# Patient Record
Sex: MALE | Race: WHITE | NOT HISPANIC OR LATINO
[De-identification: names, ages, dates, MRNs, and addresses within clinical notes are randomized per-mention and may not be internally consistent; named-entity substitution may affect disease eponyms.]

---

## 2017-01-31 ENCOUNTER — RESULT CHARGE (OUTPATIENT)
Age: 22
End: 2017-01-31

## 2017-01-31 ENCOUNTER — APPOINTMENT (OUTPATIENT)
Dept: PEDIATRIC ADOLESCENT MEDICINE | Facility: CLINIC | Age: 22
End: 2017-01-31

## 2017-01-31 DIAGNOSIS — Z00.00 ENCOUNTER FOR GENERAL ADULT MEDICAL EXAMINATION W/OUT ABNORMAL FINDINGS: ICD-10-CM

## 2017-02-10 LAB — GLUCOSE BLDC GLUCOMTR-MCNC: NORMAL

## 2017-03-17 ENCOUNTER — RESULT CHARGE (OUTPATIENT)
Age: 22
End: 2017-03-17

## 2017-03-17 ENCOUNTER — APPOINTMENT (OUTPATIENT)
Dept: PEDIATRIC ADOLESCENT MEDICINE | Facility: CLINIC | Age: 22
End: 2017-03-17

## 2017-03-20 LAB — GLUCOSE BLDC GLUCOMTR-MCNC: NORMAL

## 2017-03-28 ENCOUNTER — APPOINTMENT (OUTPATIENT)
Dept: PEDIATRIC ADOLESCENT MEDICINE | Facility: CLINIC | Age: 22
End: 2017-03-28

## 2017-03-29 ENCOUNTER — APPOINTMENT (OUTPATIENT)
Dept: PEDIATRIC ADOLESCENT MEDICINE | Facility: CLINIC | Age: 22
End: 2017-03-29

## 2017-03-29 DIAGNOSIS — Z71.3 DIETARY COUNSELING AND SURVEILLANCE: ICD-10-CM

## 2017-03-30 LAB — GLUCOSE BLDC GLUCOMTR-MCNC: NORMAL

## 2017-04-27 ENCOUNTER — APPOINTMENT (OUTPATIENT)
Dept: PEDIATRIC ADOLESCENT MEDICINE | Facility: CLINIC | Age: 22
End: 2017-04-27

## 2017-04-27 VITALS
RESPIRATION RATE: 16 BRPM | TEMPERATURE: 97.9 F | SYSTOLIC BLOOD PRESSURE: 110 MMHG | HEART RATE: 64 BPM | DIASTOLIC BLOOD PRESSURE: 68 MMHG

## 2017-04-27 DIAGNOSIS — L03.012 CELLULITIS OF LEFT FINGER: ICD-10-CM

## 2017-05-10 ENCOUNTER — APPOINTMENT (OUTPATIENT)
Dept: PEDIATRIC ADOLESCENT MEDICINE | Facility: CLINIC | Age: 22
End: 2017-05-10

## 2017-05-10 VITALS
DIASTOLIC BLOOD PRESSURE: 70 MMHG | HEART RATE: 60 BPM | RESPIRATION RATE: 16 BRPM | SYSTOLIC BLOOD PRESSURE: 120 MMHG | TEMPERATURE: 97.7 F

## 2017-05-10 DIAGNOSIS — M25.552 PAIN IN LEFT HIP: ICD-10-CM

## 2017-05-10 RX ORDER — IBUPROFEN 200 MG/1
200 TABLET, FILM COATED ORAL
Refills: 0 | Status: COMPLETED | OUTPATIENT
Start: 2017-05-10

## 2017-05-10 RX ADMIN — IBUPROFEN 2 MG: 200 TABLET, FILM COATED ORAL at 00:00

## 2017-05-23 ENCOUNTER — APPOINTMENT (OUTPATIENT)
Dept: PEDIATRIC ADOLESCENT MEDICINE | Facility: CLINIC | Age: 22
End: 2017-05-23

## 2017-05-23 VITALS
HEART RATE: 70 BPM | SYSTOLIC BLOOD PRESSURE: 115 MMHG | RESPIRATION RATE: 16 BRPM | DIASTOLIC BLOOD PRESSURE: 60 MMHG | TEMPERATURE: 98.2 F

## 2017-05-23 DIAGNOSIS — S69.92XA UNSPECIFIED INJURY OF LEFT WRIST, HAND AND FINGER(S), INITIAL ENCOUNTER: ICD-10-CM

## 2017-05-23 RX ORDER — ACETAMINOPHEN 325 MG/1
325 TABLET ORAL
Refills: 0 | Status: COMPLETED | OUTPATIENT
Start: 2017-05-23

## 2017-05-23 RX ADMIN — ACETAMINOPHEN 2 MG: 325 TABLET ORAL at 00:00

## 2017-06-09 ENCOUNTER — APPOINTMENT (OUTPATIENT)
Dept: PEDIATRIC ADOLESCENT MEDICINE | Facility: CLINIC | Age: 22
End: 2017-06-09

## 2017-06-09 ENCOUNTER — OUTPATIENT (OUTPATIENT)
Dept: OUTPATIENT SERVICES | Facility: HOSPITAL | Age: 22
LOS: 1 days | Discharge: HOME | End: 2017-06-09

## 2017-06-09 VITALS
RESPIRATION RATE: 16 BRPM | HEART RATE: 72 BPM | SYSTOLIC BLOOD PRESSURE: 120 MMHG | DIASTOLIC BLOOD PRESSURE: 70 MMHG | TEMPERATURE: 98.5 F

## 2017-06-09 DIAGNOSIS — Z00.00 ENCOUNTER FOR GENERAL ADULT MEDICAL EXAMINATION W/OUT ABNORMAL FINDINGS: ICD-10-CM

## 2017-06-09 DIAGNOSIS — Z71.89 OTHER SPECIFIED COUNSELING: ICD-10-CM

## 2017-06-09 DIAGNOSIS — Z87.09 PERSONAL HISTORY OF OTHER DISEASES OF THE RESPIRATORY SYSTEM: ICD-10-CM

## 2017-06-12 LAB — BACTERIA THROAT CULT: NORMAL

## 2017-06-28 DIAGNOSIS — Z71.89 OTHER SPECIFIED COUNSELING: ICD-10-CM

## 2017-06-28 DIAGNOSIS — J02.9 ACUTE PHARYNGITIS, UNSPECIFIED: ICD-10-CM

## 2018-09-28 ENCOUNTER — INPATIENT (INPATIENT)
Facility: HOSPITAL | Age: 23
LOS: 4 days | Discharge: HOME | End: 2018-10-03
Attending: INTERNAL MEDICINE | Admitting: INTERNAL MEDICINE

## 2018-09-28 VITALS
DIASTOLIC BLOOD PRESSURE: 74 MMHG | HEIGHT: 68 IN | RESPIRATION RATE: 24 BRPM | SYSTOLIC BLOOD PRESSURE: 128 MMHG | HEART RATE: 118 BPM | OXYGEN SATURATION: 99 % | WEIGHT: 151.02 LBS | TEMPERATURE: 96 F

## 2018-09-28 LAB
ALBUMIN SERPL ELPH-MCNC: 5.4 G/DL — HIGH (ref 3.5–5.2)
ALP SERPL-CCNC: 232 U/L — HIGH (ref 30–115)
ALT FLD-CCNC: 20 U/L — SIGNIFICANT CHANGE UP (ref 0–41)
ANION GAP SERPL CALC-SCNC: 40 MMOL/L — HIGH (ref 7–14)
APPEARANCE UR: CLEAR — SIGNIFICANT CHANGE UP
AST SERPL-CCNC: 25 U/L — SIGNIFICANT CHANGE UP (ref 0–41)
BASE EXCESS BLDV CALC-SCNC: -17.9 MMOL/L — LOW (ref -2–2)
BASOPHILS # BLD AUTO: 0.04 K/UL — SIGNIFICANT CHANGE UP (ref 0–0.2)
BASOPHILS NFR BLD AUTO: 0.2 % — SIGNIFICANT CHANGE UP (ref 0–1)
BILIRUB SERPL-MCNC: 0.4 MG/DL — SIGNIFICANT CHANGE UP (ref 0.2–1.2)
BILIRUB UR-MCNC: NEGATIVE — SIGNIFICANT CHANGE UP
BUN SERPL-MCNC: 21 MG/DL — HIGH (ref 10–20)
CA-I SERPL-SCNC: 1.4 MMOL/L — HIGH (ref 1.12–1.3)
CALCIUM SERPL-MCNC: 10.9 MG/DL — HIGH (ref 8.5–10.1)
CHLORIDE SERPL-SCNC: 87 MMOL/L — LOW (ref 98–110)
CO2 SERPL-SCNC: 9 MMOL/L — CRITICAL LOW (ref 17–32)
COLOR SPEC: YELLOW — SIGNIFICANT CHANGE UP
CREAT SERPL-MCNC: 1.2 MG/DL — SIGNIFICANT CHANGE UP (ref 0.7–1.5)
DIFF PNL FLD: NEGATIVE — SIGNIFICANT CHANGE UP
EOSINOPHIL # BLD AUTO: 0.01 K/UL — SIGNIFICANT CHANGE UP (ref 0–0.7)
EOSINOPHIL NFR BLD AUTO: 0.1 % — SIGNIFICANT CHANGE UP (ref 0–8)
ETHANOL SERPL-MCNC: <10 MG/DL — HIGH
GAS PNL BLDV: 139 MMOL/L — SIGNIFICANT CHANGE UP (ref 136–145)
GAS PNL BLDV: SIGNIFICANT CHANGE UP
GAS PNL BLDV: SIGNIFICANT CHANGE UP
GLUCOSE SERPL-MCNC: 479 MG/DL — CRITICAL HIGH (ref 70–99)
GLUCOSE UR QL: >=1000
HCO3 BLDV-SCNC: 12 MMOL/L — LOW (ref 22–29)
HCT VFR BLD CALC: 48.4 % — SIGNIFICANT CHANGE UP (ref 42–52)
HCT VFR BLDA CALC: 53.3 % — HIGH (ref 34–44)
HGB BLD CALC-MCNC: 17.4 G/DL — SIGNIFICANT CHANGE UP (ref 14–18)
HGB BLD-MCNC: 16.5 G/DL — SIGNIFICANT CHANGE UP (ref 14–18)
IMM GRANULOCYTES NFR BLD AUTO: 0.6 % — HIGH (ref 0.1–0.3)
KETONES UR-MCNC: >=80
LACTATE BLDV-MCNC: 2.5 MMOL/L — HIGH (ref 0.5–1.6)
LACTATE SERPL-SCNC: 2.5 MMOL/L — HIGH (ref 0.5–2.2)
LEUKOCYTE ESTERASE UR-ACNC: NEGATIVE — SIGNIFICANT CHANGE UP
LIDOCAIN IGE QN: 9 U/L — SIGNIFICANT CHANGE UP (ref 7–60)
LYMPHOCYTES # BLD AUTO: 1.77 K/UL — SIGNIFICANT CHANGE UP (ref 1.2–3.4)
LYMPHOCYTES # BLD AUTO: 9.7 % — LOW (ref 20.5–51.1)
MCHC RBC-ENTMCNC: 31.7 PG — HIGH (ref 27–31)
MCHC RBC-ENTMCNC: 34.1 G/DL — SIGNIFICANT CHANGE UP (ref 32–37)
MCV RBC AUTO: 92.9 FL — SIGNIFICANT CHANGE UP (ref 80–94)
MONOCYTES # BLD AUTO: 0.59 K/UL — SIGNIFICANT CHANGE UP (ref 0.1–0.6)
MONOCYTES NFR BLD AUTO: 3.2 % — SIGNIFICANT CHANGE UP (ref 1.7–9.3)
NEUTROPHILS # BLD AUTO: 15.8 K/UL — HIGH (ref 1.4–6.5)
NEUTROPHILS NFR BLD AUTO: 86.2 % — HIGH (ref 42.2–75.2)
NITRITE UR-MCNC: NEGATIVE — SIGNIFICANT CHANGE UP
NRBC # BLD: 0 /100 WBCS — SIGNIFICANT CHANGE UP (ref 0–0)
PCO2 BLDV: 41 MMHG — SIGNIFICANT CHANGE UP (ref 41–51)
PH BLDV: 7.07 — LOW (ref 7.26–7.43)
PH UR: 6 — SIGNIFICANT CHANGE UP (ref 5–8)
PLATELET # BLD AUTO: 325 K/UL — SIGNIFICANT CHANGE UP (ref 130–400)
PO2 BLDV: 35 MMHG — SIGNIFICANT CHANGE UP (ref 20–40)
POTASSIUM BLDV-SCNC: 5 MMOL/L — SIGNIFICANT CHANGE UP (ref 3.3–5.6)
POTASSIUM SERPL-MCNC: 5.4 MMOL/L — HIGH (ref 3.5–5)
POTASSIUM SERPL-SCNC: 5.4 MMOL/L — HIGH (ref 3.5–5)
PROT SERPL-MCNC: 9.2 G/DL — HIGH (ref 6–8)
PROT UR-MCNC: 30
RBC # BLD: 5.21 M/UL — SIGNIFICANT CHANGE UP (ref 4.7–6.1)
RBC # FLD: 12.3 % — SIGNIFICANT CHANGE UP (ref 11.5–14.5)
SAO2 % BLDV: 53 % — SIGNIFICANT CHANGE UP
SODIUM SERPL-SCNC: 136 MMOL/L — SIGNIFICANT CHANGE UP (ref 135–146)
SP GR SPEC: >=1.03 — SIGNIFICANT CHANGE UP (ref 1.01–1.03)
UROBILINOGEN FLD QL: 0.2 — SIGNIFICANT CHANGE UP (ref 0.2–0.2)
WBC # BLD: 18.32 K/UL — HIGH (ref 4.8–10.8)
WBC # FLD AUTO: 18.32 K/UL — HIGH (ref 4.8–10.8)

## 2018-09-28 RX ORDER — INSULIN HUMAN 100 [IU]/ML
7 INJECTION, SOLUTION SUBCUTANEOUS
Qty: 100 | Refills: 0 | Status: DISCONTINUED | OUTPATIENT
Start: 2018-09-28 | End: 2018-09-29

## 2018-09-28 RX ORDER — ONDANSETRON 8 MG/1
4 TABLET, FILM COATED ORAL ONCE
Qty: 0 | Refills: 0 | Status: COMPLETED | OUTPATIENT
Start: 2018-09-28 | End: 2018-09-28

## 2018-09-28 RX ORDER — SODIUM CHLORIDE 9 MG/ML
1000 INJECTION INTRAMUSCULAR; INTRAVENOUS; SUBCUTANEOUS ONCE
Qty: 0 | Refills: 0 | Status: COMPLETED | OUTPATIENT
Start: 2018-09-28 | End: 2018-09-28

## 2018-09-28 RX ORDER — INSULIN HUMAN 100 [IU]/ML
7 INJECTION, SOLUTION SUBCUTANEOUS ONCE
Qty: 0 | Refills: 0 | Status: COMPLETED | OUTPATIENT
Start: 2018-09-28 | End: 2018-09-28

## 2018-09-28 RX ORDER — SODIUM CHLORIDE 9 MG/ML
1000 INJECTION, SOLUTION INTRAVENOUS ONCE
Qty: 0 | Refills: 0 | Status: COMPLETED | OUTPATIENT
Start: 2018-09-28 | End: 2018-09-28

## 2018-09-28 RX ADMIN — ONDANSETRON 4 MILLIGRAM(S): 8 TABLET, FILM COATED ORAL at 21:24

## 2018-09-28 RX ADMIN — SODIUM CHLORIDE 2000 MILLILITER(S): 9 INJECTION INTRAMUSCULAR; INTRAVENOUS; SUBCUTANEOUS at 22:00

## 2018-09-28 RX ADMIN — SODIUM CHLORIDE 2000 MILLILITER(S): 9 INJECTION, SOLUTION INTRAVENOUS at 21:24

## 2018-09-28 RX ADMIN — INSULIN HUMAN 7 UNIT(S)/HR: 100 INJECTION, SOLUTION SUBCUTANEOUS at 23:00

## 2018-09-28 RX ADMIN — INSULIN HUMAN 7 UNIT(S): 100 INJECTION, SOLUTION SUBCUTANEOUS at 23:00

## 2018-09-28 NOTE — ED PROVIDER NOTE - PROGRESS NOTE DETAILS
Patient was given IVF and started on insulin drip in the ED. Felt better after IVF. ICU admission for DKA with AG of 40. Vomiting improved after hydration and Zofran.

## 2018-09-28 NOTE — H&P ADULT - HISTORY OF PRESENT ILLNESS
23M with PMH of DM I, ADHD p/w intractable vomiting and diffuse crampy abdominal pain that started morning of admission. Night prior to presentation he was celebrating birthday and binge drinking, around 20 drinks. Has vomiting about 10 times today, nonbloody.  Denies any fever, chills, sick contacts, SOB, CP, back pain, dysuria. Patient states he is complaint with his medications however recent A1c was 14. He takes Levemir 34units daily and calculates his Humalog each meal. Reports he tries to not exceed 240g carbs in one day. Has had 3 prior episodes of DKA, last one was 3 years ago. Was diagnosed with DM type I when he was about 15yo. He cannot recall his endocrinologist's name.    In ED: Vitals /74, , T 96.4, 99% on RA. U/A +Ketones, glucose >1000, AG 40. VBG on presentation with pH 7.07 lactic acid 2.5.

## 2018-09-28 NOTE — H&P ADULT - ASSESSMENT
23M with PMH of DM I, ADHD p/w DKA     #DKA   - ICU admission   - patient bolused 4L in ED, will c/w IVF NS at 200cc/hr   - Will replete K if <5.3 at 20-30meq per L of IVF  - C/w insulin drip at 0.1U/kg/hr   - Monitor FS q2H  - Once serum glucose falls to < 200 mg/dL change fluids to 5% dextrose with 0.45% sodium chloride   - BMP q4H until gap closes, monitor K, Na, Mg and AG     Dispo: from home   DVT PPx: encourage ambulation 23M with PMH of DM I, ADHD p/w DKA     #DKA   - ICU admission   - patient bolused 4L in ED, will c/w IVF NS at 200cc/hr   - Will replete K if <5.3 at 20-30meq per L of IVF  - C/w insulin drip at 0.1U/kg/hr   - Monitor FS q2H  - Once serum glucose falls to < 200 mg/dL change fluids to 5% dextrose with 0.45% sodium chloride   - BMP q4H until gap closes, monitor K, Na, Mg and AG     Dispo: from home   Joshua (father) - [phone]

## 2018-09-28 NOTE — ED ADULT NURSE NOTE - OBJECTIVE STATEMENT
patient hx of dm and DKA. patient was out celebrating his birthday and was drinking alcohol last night . patient woke up with n\v and ab pain. patient states he feels like he was going into dka again.

## 2018-09-28 NOTE — ED PROVIDER NOTE - ATTENDING CONTRIBUTION TO CARE
I personally evaluated the patient. I reviewed the Resident’s or Physician Assistant’s note (as assigned above), and agree with the findings and plan except as documented in my note.     23 male here for intractable vomiting. Had been drinking alcohol in celebration of his birthday. Now admits to elevated fingerstick at home, polyuria, thirst, generalized malaise    PMH: DM, ADHD    PE: male in no distress. HEENT: mucosa dry. non icteric. CHEST: normal work of breathing. CV: pulses intact. SKIN: normal no pallor. NEURO: AAO 3 no focal deficits.     Impression: DKA    Plan: IV labs imaging supportive care resuscitation and admission to ICU setting.

## 2018-09-28 NOTE — ED PROVIDER NOTE - MEDICAL DECISION MAKING DETAILS
23 male diabetic here for intractable vomiting and suspicion of diabetic emergency after binge drinking this weekend.  Found to be in DKA got crystalloid resuscitation, insulin drip, labs and admission to ICU setting.

## 2018-09-28 NOTE — H&P ADULT - NSHPPHYSICALEXAM_GEN_ALL_CORE
PHYSICAL EXAM:  GEN: No acute distress  LUNGS: Clear to auscultation bilaterally   HEART: S1/S2 present. RRR.   ABD: Soft, non-tender, non-distended. Bowel sounds present  EXT: NC/NC/NE/2+PP/GUTIERREZ  NEURO: AAOX3

## 2018-09-28 NOTE — ED ADULT TRIAGE NOTE - RESPIRATORY RATE (BREATHS/MIN)
Patient had 48 Hour Holter Monitor placed 04/09/18 by Christine Chávez. Patient tolerated well and verbalized understanding.    
24

## 2018-09-28 NOTE — ED PROVIDER NOTE - PHYSICAL EXAMINATION
PHYSICAL EXAM: I have reviewed current vital signs.  GENERAL: NAD, well-nourished; well-developed.  HEAD:  Normocephalic, atraumatic.  EYES: PERRL, conjunctiva and sclera clear.  ENT: Dry MM, no erythema/exudates.  NECK: Supple, full ROM.  CHEST/LUNG: Clear to auscultation bilaterally; no wheezes, rales, or rhonchi.  HEART: Regular rate and rhythm, normal S1 and S2; no murmurs, rubs, or gallops.  ABDOMEN: Soft, nontender, nondistended; bowel sounds present.  EXTREMITIES:  2+ peripheral pulses.  NEUROLOGY: AAO x 3. Motor 5/5. No focal neurological deficits.  SKIN: No rashes or lesions.

## 2018-09-28 NOTE — ED PROVIDER NOTE - NS ED ROS FT
Constitutional:  No fevers or chills.  Eyes:  No visual changes.  ENT:  No sore throat.  Neck:  No neck pain.  Cardiac:  No CP.  Resp:  No cough or SOB.  GI:  +N/v and crampy abdominal pain. No diarrhea.  :  No dysuria, frequency, or hematuria.  MSK:  No myalgias or joint pain/swelling.  Neuro:  No headache, dizziness, or weakness.  Skin:  No skin rash. Constitutional:  No fevers or chills.  Eyes:  No visual changes.  ENT:  No sore throat.  Neck:  No neck pain.  Cardiac:  No CP.  Resp:  No cough or SOB.  GI:  +N/v and crampy abdominal pain. No diarrhea.  :  +Frequency, no dysuria or hematuria.  MSK:  No myalgias or joint pain/swelling.  Neuro:  No headache, dizziness, or weakness.  Skin:  No skin rash.

## 2018-09-28 NOTE — ED PROVIDER NOTE - OBJECTIVE STATEMENT
24yo M with PMH of DM I p/w intractable vomiting and diffuse crampy abdominal pain that started at about 4AM this morning. States he was celebrating his birthday last night and drank about 6-7 drinks. Has vomiting about 10 times today, nonbloody. FS of 593 in triage. Denies any recent sickness, f/c, SOB, CP, back pain, extremity weakness, or urinary complaints. Reports his insulin to carb ratio is 1:10,  is his target, sensitivity factor is 49, BS typically runs about 150s at home, and he has been compliant with his insulin regimen. He takes Levemir 34units daily and calculates his Humalog each meal. Reports he tries to not exceed 240g carbs in one day. Has had 3 prior episodes of DKA, last one was 3 years ago. Was diagnosed with DM type I when he was about 13yo. PCP is Dr. Faye, cannot recall his endocrinologist's name. 22yo M with PMH of DM I p/w intractable vomiting and diffuse crampy abdominal pain that started at about 4AM this morning. States he was celebrating his birthday last night and drank about 6-7 drinks. Has vomiting about 10 times today, nonbloody. FS of 593 in triage. Denies any recent sickness, f/c, SOB, CP, back pain, extremity weakness, or urinary complaints. Reports his insulin to carb ratio is 1:10,  is his target, sensitivity factor is 49, BS typically runs about 150s at home, and he has been compliant with his insulin regimen. He takes Levemir 34units daily and calculates his Humalog each meal. Reports he tries to not exceed 240g carbs in one day. Has had 3 prior episodes of DKA, last one was 3 years ago. Was diagnosed with DM type I when he was about 13yo. PCP is Dr. Summers, cannot recall his endocrinologist's name. 22yo M with PMH of DM I p/w intractable vomiting and diffuse crampy abdominal pain that started at about 4AM this morning. States he was celebrating his birthday last night and drank about 6-7 drinks. Has vomiting about 10 times today, nonbloody. FS of 593 in triage. Denies any recent sickness, f/c, SOB, CP, back pain, extremity weakness, or urinary complaints. Reports his insulin to carb ratio is 1:10,  is his target, sensitivity factor is 49, BS typically runs about 150s at home, and he has been compliant with his insulin regimen. He takes Levemir 34units daily and calculates his Humalog each meal. Reports he tries to not exceed 240g carbs in one day. Has had 3 prior episodes of DKA, last one was 3 years ago. Was diagnosed with DM type I when he was about 15yo. PCP is Dr. Summers, cannot recall his endocrinologist's name. His Dad does state he does not have the best diet. 24yo M with PMH of DM I p/w intractable vomiting and diffuse crampy abdominal pain that started at about 4AM this morning. States he was celebrating his birthday last night and drank about 6-7 drinks. Has vomited about 10 times today, nonbloody. FS of 593 in triage. Denies any recent sickness, f/c, SOB, CP, back pain, extremity weakness, or urinary complaints. Reports his insulin to carb ratio is 1:10,  is his target, BS typically runs about 150s at home, and he has been compliant with his insulin regimen. He takes Levemir 34units daily and calculates his Humalog each meal. Reports he tries to not exceed 240g carbs in one day. Has had 3 prior episodes of DKA, last one was 3 years ago. Was diagnosed with DM type I when he was about 15yo. PCP is Dr. Summers, cannot recall his endocrinologist's name. His Dad does state he does not have the best diet. 22yo M with PMH of DM I p/w intractable vomiting and diffuse crampy abdominal pain that started at about 4AM this morning. States he was celebrating his birthday last night and drank about 6-7 drinks. Has vomited about 10 times today, nonbloody. FS of 593 in triage. Denies any recent sickness, f/c, SOB, CP, or back pain. Reports his insulin to carb ratio is 1:10,  is his target, BS typically runs about 150s at home, and he has been compliant with his insulin regimen. He takes Levemir 34units daily and calculates his Humalog each meal. Reports he tries to not exceed 240g carbs in one day. Has had 3 prior episodes of DKA, last one was 3 years ago. Was diagnosed with DM type I when he was about 13yo. PCP is Dr. Summers, cannot recall his endocrinologist's name. His Dad does state he does not have the best diet.

## 2018-09-28 NOTE — H&P ADULT - NSHPLABSRESULTS_GEN_ALL_CORE
Vitals:    Vital Signs Last 24 Hrs  T(C): 36.1 (28 Sep 2018 22:03), Max: 36.1 (28 Sep 2018 22:03)  T(F): 97 (28 Sep 2018 22:03), Max: 97 (28 Sep 2018 22:03)  HR: 118 (28 Sep 2018 20:27) (118 - 118)  BP: 132/81 (28 Sep 2018 22:03) (128/74 - 132/81)  BP(mean): --  RR: 20 (28 Sep 2018 22:03) (20 - 24)  SpO2: 99% (28 Sep 2018 22:03) (99% - 99%)    Labs:     09-28    136  |  87<L>  |  21<H>  ----------------------------<  479<HH>  5.4<H>   |  9<LL>  |  1.2    Ca    10.9<H>      28 Sep 2018 21:05    TPro  9.2<H>  /  Alb  5.4<H>  /  TBili  0.4  /  DBili  x   /  AST  25  /  ALT  20  /  AlkPhos  232<H>  09-28                          16.5   18.32 )-----------( 325      ( 28 Sep 2018 21:05 )             48.4         LIVER FUNCTIONS - ( 28 Sep 2018 21:05 )  Alb: 5.4 g/dL / Pro: 9.2 g/dL / ALK PHOS: 232 U/L / ALT: 20 U/L / AST: 25 U/L / GGT: x             Urinalysis Basic - ( 28 Sep 2018 21:05 )    Color: Yellow / Appearance: Clear / SG: >=1.030 / pH: x  Gluc: x / Ketone: >=80  / Bili: Negative / Urobili: 0.2   Blood: x / Protein: 30 / Nitrite: Negative   Leuk Esterase: Negative / RBC: x / WBC x   Sq Epi: x / Non Sq Epi: x / Bacteria: x

## 2018-09-29 LAB
ALBUMIN SERPL ELPH-MCNC: 4.3 G/DL — SIGNIFICANT CHANGE UP (ref 3.5–5.2)
ALBUMIN SERPL ELPH-MCNC: 4.7 G/DL — SIGNIFICANT CHANGE UP (ref 3.5–5.2)
ALP SERPL-CCNC: 161 U/L — HIGH (ref 30–115)
ALP SERPL-CCNC: 198 U/L — HIGH (ref 30–115)
ALT FLD-CCNC: 13 U/L — SIGNIFICANT CHANGE UP (ref 0–41)
ALT FLD-CCNC: 16 U/L — SIGNIFICANT CHANGE UP (ref 0–41)
ANION GAP SERPL CALC-SCNC: 15 MMOL/L — HIGH (ref 7–14)
ANION GAP SERPL CALC-SCNC: 17 MMOL/L — HIGH (ref 7–14)
ANION GAP SERPL CALC-SCNC: 19 MMOL/L — HIGH (ref 7–14)
ANION GAP SERPL CALC-SCNC: 19 MMOL/L — HIGH (ref 7–14)
ANION GAP SERPL CALC-SCNC: 21 MMOL/L — HIGH (ref 7–14)
ANION GAP SERPL CALC-SCNC: 29 MMOL/L — HIGH (ref 7–14)
APPEARANCE UR: CLEAR — SIGNIFICANT CHANGE UP
AST SERPL-CCNC: 16 U/L — SIGNIFICANT CHANGE UP (ref 0–41)
AST SERPL-CCNC: 19 U/L — SIGNIFICANT CHANGE UP (ref 0–41)
B-OH-BUTYR SERPL-SCNC: 9.9 MMOL/L — HIGH
BASOPHILS # BLD AUTO: 0.01 K/UL — SIGNIFICANT CHANGE UP (ref 0–0.2)
BASOPHILS # BLD AUTO: 0.01 K/UL — SIGNIFICANT CHANGE UP (ref 0–0.2)
BASOPHILS NFR BLD AUTO: 0.1 % — SIGNIFICANT CHANGE UP (ref 0–1)
BASOPHILS NFR BLD AUTO: 0.1 % — SIGNIFICANT CHANGE UP (ref 0–1)
BILIRUB SERPL-MCNC: 0.3 MG/DL — SIGNIFICANT CHANGE UP (ref 0.2–1.2)
BILIRUB SERPL-MCNC: 0.5 MG/DL — SIGNIFICANT CHANGE UP (ref 0.2–1.2)
BILIRUB UR-MCNC: ABNORMAL
BUN SERPL-MCNC: 10 MG/DL — SIGNIFICANT CHANGE UP (ref 10–20)
BUN SERPL-MCNC: 11 MG/DL — SIGNIFICANT CHANGE UP (ref 10–20)
BUN SERPL-MCNC: 11 MG/DL — SIGNIFICANT CHANGE UP (ref 10–20)
BUN SERPL-MCNC: 12 MG/DL — SIGNIFICANT CHANGE UP (ref 10–20)
BUN SERPL-MCNC: 17 MG/DL — SIGNIFICANT CHANGE UP (ref 10–20)
BUN SERPL-MCNC: 9 MG/DL — LOW (ref 10–20)
CALCIUM SERPL-MCNC: 9 MG/DL — SIGNIFICANT CHANGE UP (ref 8.5–10.1)
CALCIUM SERPL-MCNC: 9.1 MG/DL — SIGNIFICANT CHANGE UP (ref 8.5–10.1)
CALCIUM SERPL-MCNC: 9.4 MG/DL — SIGNIFICANT CHANGE UP (ref 8.5–10.1)
CALCIUM SERPL-MCNC: 9.4 MG/DL — SIGNIFICANT CHANGE UP (ref 8.5–10.1)
CALCIUM SERPL-MCNC: 9.5 MG/DL — SIGNIFICANT CHANGE UP (ref 8.5–10.1)
CALCIUM SERPL-MCNC: 9.9 MG/DL — SIGNIFICANT CHANGE UP (ref 8.5–10.1)
CHLORIDE SERPL-SCNC: 100 MMOL/L — SIGNIFICANT CHANGE UP (ref 98–110)
CHLORIDE SERPL-SCNC: 103 MMOL/L — SIGNIFICANT CHANGE UP (ref 98–110)
CHLORIDE SERPL-SCNC: 97 MMOL/L — LOW (ref 98–110)
CHLORIDE SERPL-SCNC: 98 MMOL/L — SIGNIFICANT CHANGE UP (ref 98–110)
CK MB CFR SERPL CALC: 2.2 NG/ML — SIGNIFICANT CHANGE UP (ref 0.6–6.3)
CK MB CFR SERPL CALC: 2.3 NG/ML — SIGNIFICANT CHANGE UP (ref 0.6–6.3)
CK SERPL-CCNC: 88 U/L — SIGNIFICANT CHANGE UP (ref 0–225)
CK SERPL-CCNC: 91 U/L — SIGNIFICANT CHANGE UP (ref 0–225)
CO2 SERPL-SCNC: 11 MMOL/L — LOW (ref 17–32)
CO2 SERPL-SCNC: 18 MMOL/L — SIGNIFICANT CHANGE UP (ref 17–32)
CO2 SERPL-SCNC: 19 MMOL/L — SIGNIFICANT CHANGE UP (ref 17–32)
CO2 SERPL-SCNC: 20 MMOL/L — SIGNIFICANT CHANGE UP (ref 17–32)
CO2 SERPL-SCNC: 21 MMOL/L — SIGNIFICANT CHANGE UP (ref 17–32)
CO2 SERPL-SCNC: 23 MMOL/L — SIGNIFICANT CHANGE UP (ref 17–32)
COLOR SPEC: YELLOW — SIGNIFICANT CHANGE UP
CREAT SERPL-MCNC: 0.7 MG/DL — SIGNIFICANT CHANGE UP (ref 0.7–1.5)
CREAT SERPL-MCNC: 0.7 MG/DL — SIGNIFICANT CHANGE UP (ref 0.7–1.5)
CREAT SERPL-MCNC: 0.8 MG/DL — SIGNIFICANT CHANGE UP (ref 0.7–1.5)
CREAT SERPL-MCNC: 0.9 MG/DL — SIGNIFICANT CHANGE UP (ref 0.7–1.5)
CULTURE RESULTS: NO GROWTH — SIGNIFICANT CHANGE UP
DIFF PNL FLD: NEGATIVE — SIGNIFICANT CHANGE UP
EOSINOPHIL # BLD AUTO: 0.03 K/UL — SIGNIFICANT CHANGE UP (ref 0–0.7)
EOSINOPHIL # BLD AUTO: 0.05 K/UL — SIGNIFICANT CHANGE UP (ref 0–0.7)
EOSINOPHIL NFR BLD AUTO: 0.2 % — SIGNIFICANT CHANGE UP (ref 0–8)
EOSINOPHIL NFR BLD AUTO: 0.4 % — SIGNIFICANT CHANGE UP (ref 0–8)
GLUCOSE BLDC GLUCOMTR-MCNC: 100 MG/DL — HIGH (ref 70–99)
GLUCOSE BLDC GLUCOMTR-MCNC: 103 MG/DL — HIGH (ref 70–99)
GLUCOSE BLDC GLUCOMTR-MCNC: 104 MG/DL — HIGH (ref 70–99)
GLUCOSE BLDC GLUCOMTR-MCNC: 107 MG/DL — HIGH (ref 70–99)
GLUCOSE BLDC GLUCOMTR-MCNC: 111 MG/DL — HIGH (ref 70–99)
GLUCOSE BLDC GLUCOMTR-MCNC: 121 MG/DL — HIGH (ref 70–99)
GLUCOSE BLDC GLUCOMTR-MCNC: 139 MG/DL — HIGH (ref 70–99)
GLUCOSE BLDC GLUCOMTR-MCNC: 145 MG/DL — HIGH (ref 70–99)
GLUCOSE BLDC GLUCOMTR-MCNC: 148 MG/DL — HIGH (ref 70–99)
GLUCOSE BLDC GLUCOMTR-MCNC: 150 MG/DL — HIGH (ref 70–99)
GLUCOSE BLDC GLUCOMTR-MCNC: 158 MG/DL — HIGH (ref 70–99)
GLUCOSE BLDC GLUCOMTR-MCNC: 174 MG/DL — HIGH (ref 70–99)
GLUCOSE BLDC GLUCOMTR-MCNC: 178 MG/DL — HIGH (ref 70–99)
GLUCOSE BLDC GLUCOMTR-MCNC: 182 MG/DL — HIGH (ref 70–99)
GLUCOSE BLDC GLUCOMTR-MCNC: 245 MG/DL — HIGH (ref 70–99)
GLUCOSE BLDC GLUCOMTR-MCNC: 53 MG/DL — LOW (ref 70–99)
GLUCOSE BLDC GLUCOMTR-MCNC: 68 MG/DL — LOW (ref 70–99)
GLUCOSE BLDC GLUCOMTR-MCNC: 73 MG/DL — SIGNIFICANT CHANGE UP (ref 70–99)
GLUCOSE BLDC GLUCOMTR-MCNC: 80 MG/DL — SIGNIFICANT CHANGE UP (ref 70–99)
GLUCOSE BLDC GLUCOMTR-MCNC: 80 MG/DL — SIGNIFICANT CHANGE UP (ref 70–99)
GLUCOSE SERPL-MCNC: 127 MG/DL — HIGH (ref 70–99)
GLUCOSE SERPL-MCNC: 151 MG/DL — HIGH (ref 70–99)
GLUCOSE SERPL-MCNC: 180 MG/DL — HIGH (ref 70–99)
GLUCOSE SERPL-MCNC: 199 MG/DL — HIGH (ref 70–99)
GLUCOSE SERPL-MCNC: 63 MG/DL — LOW (ref 70–99)
GLUCOSE SERPL-MCNC: 70 MG/DL — SIGNIFICANT CHANGE UP (ref 70–99)
GLUCOSE UR QL: 500 MG/DL
HCT VFR BLD CALC: 37.2 % — LOW (ref 42–52)
HCT VFR BLD CALC: 37.5 % — LOW (ref 42–52)
HGB BLD-MCNC: 13.1 G/DL — LOW (ref 14–18)
HGB BLD-MCNC: 13.5 G/DL — LOW (ref 14–18)
IMM GRANULOCYTES NFR BLD AUTO: 0.3 % — SIGNIFICANT CHANGE UP (ref 0.1–0.3)
IMM GRANULOCYTES NFR BLD AUTO: 0.4 % — HIGH (ref 0.1–0.3)
KETONES UR-MCNC: 40
LEUKOCYTE ESTERASE UR-ACNC: ABNORMAL
LYMPHOCYTES # BLD AUTO: 1.76 K/UL — SIGNIFICANT CHANGE UP (ref 1.2–3.4)
LYMPHOCYTES # BLD AUTO: 13.8 % — LOW (ref 20.5–51.1)
LYMPHOCYTES # BLD AUTO: 18.3 % — LOW (ref 20.5–51.1)
LYMPHOCYTES # BLD AUTO: 2.04 K/UL — SIGNIFICANT CHANGE UP (ref 1.2–3.4)
MAGNESIUM SERPL-MCNC: 1.7 MG/DL — LOW (ref 1.8–2.4)
MCHC RBC-ENTMCNC: 31.2 PG — HIGH (ref 27–31)
MCHC RBC-ENTMCNC: 32.1 PG — HIGH (ref 27–31)
MCHC RBC-ENTMCNC: 34.9 G/DL — SIGNIFICANT CHANGE UP (ref 32–37)
MCHC RBC-ENTMCNC: 36.3 G/DL — SIGNIFICANT CHANGE UP (ref 32–37)
MCV RBC AUTO: 88.4 FL — SIGNIFICANT CHANGE UP (ref 80–94)
MCV RBC AUTO: 89.3 FL — SIGNIFICANT CHANGE UP (ref 80–94)
MONOCYTES # BLD AUTO: 0.85 K/UL — HIGH (ref 0.1–0.6)
MONOCYTES # BLD AUTO: 0.99 K/UL — HIGH (ref 0.1–0.6)
MONOCYTES NFR BLD AUTO: 7.6 % — SIGNIFICANT CHANGE UP (ref 1.7–9.3)
MONOCYTES NFR BLD AUTO: 7.8 % — SIGNIFICANT CHANGE UP (ref 1.7–9.3)
NEUTROPHILS # BLD AUTO: 8.14 K/UL — HIGH (ref 1.4–6.5)
NEUTROPHILS # BLD AUTO: 9.93 K/UL — HIGH (ref 1.4–6.5)
NEUTROPHILS NFR BLD AUTO: 73.3 % — SIGNIFICANT CHANGE UP (ref 42.2–75.2)
NEUTROPHILS NFR BLD AUTO: 77.7 % — HIGH (ref 42.2–75.2)
NITRITE UR-MCNC: NEGATIVE — SIGNIFICANT CHANGE UP
NRBC # BLD: 0 /100 WBCS — SIGNIFICANT CHANGE UP (ref 0–0)
NRBC # BLD: 0 /100 WBCS — SIGNIFICANT CHANGE UP (ref 0–0)
PH UR: 6 — SIGNIFICANT CHANGE UP (ref 5–8)
PLATELET # BLD AUTO: 258 K/UL — SIGNIFICANT CHANGE UP (ref 130–400)
PLATELET # BLD AUTO: 283 K/UL — SIGNIFICANT CHANGE UP (ref 130–400)
POTASSIUM SERPL-MCNC: 3.8 MMOL/L — SIGNIFICANT CHANGE UP (ref 3.5–5)
POTASSIUM SERPL-MCNC: 3.8 MMOL/L — SIGNIFICANT CHANGE UP (ref 3.5–5)
POTASSIUM SERPL-MCNC: 3.9 MMOL/L — SIGNIFICANT CHANGE UP (ref 3.5–5)
POTASSIUM SERPL-MCNC: 4 MMOL/L — SIGNIFICANT CHANGE UP (ref 3.5–5)
POTASSIUM SERPL-MCNC: 4.1 MMOL/L — SIGNIFICANT CHANGE UP (ref 3.5–5)
POTASSIUM SERPL-MCNC: 4.5 MMOL/L — SIGNIFICANT CHANGE UP (ref 3.5–5)
POTASSIUM SERPL-SCNC: 3.8 MMOL/L — SIGNIFICANT CHANGE UP (ref 3.5–5)
POTASSIUM SERPL-SCNC: 3.8 MMOL/L — SIGNIFICANT CHANGE UP (ref 3.5–5)
POTASSIUM SERPL-SCNC: 3.9 MMOL/L — SIGNIFICANT CHANGE UP (ref 3.5–5)
POTASSIUM SERPL-SCNC: 4 MMOL/L — SIGNIFICANT CHANGE UP (ref 3.5–5)
POTASSIUM SERPL-SCNC: 4.1 MMOL/L — SIGNIFICANT CHANGE UP (ref 3.5–5)
POTASSIUM SERPL-SCNC: 4.5 MMOL/L — SIGNIFICANT CHANGE UP (ref 3.5–5)
PROT SERPL-MCNC: 7.1 G/DL — SIGNIFICANT CHANGE UP (ref 6–8)
PROT SERPL-MCNC: 8.2 G/DL — HIGH (ref 6–8)
PROT UR-MCNC: ABNORMAL MG/DL
RBC # BLD: 4.2 M/UL — LOW (ref 4.7–6.1)
RBC # BLD: 4.21 M/UL — LOW (ref 4.7–6.1)
RBC # FLD: 12.2 % — SIGNIFICANT CHANGE UP (ref 11.5–14.5)
RBC # FLD: 12.3 % — SIGNIFICANT CHANGE UP (ref 11.5–14.5)
SODIUM SERPL-SCNC: 137 MMOL/L — SIGNIFICANT CHANGE UP (ref 135–146)
SODIUM SERPL-SCNC: 138 MMOL/L — SIGNIFICANT CHANGE UP (ref 135–146)
SODIUM SERPL-SCNC: 139 MMOL/L — SIGNIFICANT CHANGE UP (ref 135–146)
SODIUM SERPL-SCNC: 140 MMOL/L — SIGNIFICANT CHANGE UP (ref 135–146)
SP GR SPEC: 1.02 — SIGNIFICANT CHANGE UP (ref 1.01–1.03)
SPECIMEN SOURCE: SIGNIFICANT CHANGE UP
TROPONIN T SERPL-MCNC: <0.01 NG/ML — SIGNIFICANT CHANGE UP
TROPONIN T SERPL-MCNC: <0.01 NG/ML — SIGNIFICANT CHANGE UP
UROBILINOGEN FLD QL: 0.2 MG/DL — SIGNIFICANT CHANGE UP (ref 0.2–0.2)
WBC # BLD: 11.12 K/UL — HIGH (ref 4.8–10.8)
WBC # BLD: 12.77 K/UL — HIGH (ref 4.8–10.8)
WBC # FLD AUTO: 11.12 K/UL — HIGH (ref 4.8–10.8)
WBC # FLD AUTO: 12.77 K/UL — HIGH (ref 4.8–10.8)
WBC UR QL: ABNORMAL /HPF

## 2018-09-29 RX ORDER — SODIUM CHLORIDE 9 MG/ML
1000 INJECTION, SOLUTION INTRAVENOUS
Qty: 0 | Refills: 0 | Status: DISCONTINUED | OUTPATIENT
Start: 2018-09-29 | End: 2018-10-01

## 2018-09-29 RX ORDER — MORPHINE SULFATE 50 MG/1
2 CAPSULE, EXTENDED RELEASE ORAL ONCE
Qty: 0 | Refills: 0 | Status: DISCONTINUED | OUTPATIENT
Start: 2018-09-29 | End: 2018-09-29

## 2018-09-29 RX ORDER — DEXTROSE MONOHYDRATE, SODIUM CHLORIDE, AND POTASSIUM CHLORIDE 50; .745; 4.5 G/1000ML; G/1000ML; G/1000ML
1000 INJECTION, SOLUTION INTRAVENOUS
Qty: 0 | Refills: 0 | Status: DISCONTINUED | OUTPATIENT
Start: 2018-09-29 | End: 2018-09-29

## 2018-09-29 RX ORDER — INSULIN LISPRO 100/ML
VIAL (ML) SUBCUTANEOUS
Qty: 0 | Refills: 0 | Status: DISCONTINUED | OUTPATIENT
Start: 2018-09-29 | End: 2018-10-01

## 2018-09-29 RX ORDER — INSULIN HUMAN 100 [IU]/ML
2 INJECTION, SOLUTION SUBCUTANEOUS
Qty: 100 | Refills: 0 | Status: DISCONTINUED | OUTPATIENT
Start: 2018-09-29 | End: 2018-10-01

## 2018-09-29 RX ORDER — DEXTROSE 50 % IN WATER 50 %
50 SYRINGE (ML) INTRAVENOUS ONCE
Qty: 0 | Refills: 0 | Status: COMPLETED | OUTPATIENT
Start: 2018-09-29 | End: 2018-09-29

## 2018-09-29 RX ORDER — DEXTROSE 50 % IN WATER 50 %
12.5 SYRINGE (ML) INTRAVENOUS ONCE
Qty: 0 | Refills: 0 | Status: DISCONTINUED | OUTPATIENT
Start: 2018-09-29 | End: 2018-10-01

## 2018-09-29 RX ORDER — INSULIN GLARGINE 100 [IU]/ML
12 INJECTION, SOLUTION SUBCUTANEOUS AT BEDTIME
Qty: 0 | Refills: 0 | Status: DISCONTINUED | OUTPATIENT
Start: 2018-09-29 | End: 2018-10-01

## 2018-09-29 RX ORDER — DEXTROSE MONOHYDRATE, SODIUM CHLORIDE, AND POTASSIUM CHLORIDE 50; .745; 4.5 G/1000ML; G/1000ML; G/1000ML
1000 INJECTION, SOLUTION INTRAVENOUS
Qty: 0 | Refills: 0 | Status: DISCONTINUED | OUTPATIENT
Start: 2018-09-29 | End: 2018-10-01

## 2018-09-29 RX ORDER — PANTOPRAZOLE SODIUM 20 MG/1
40 TABLET, DELAYED RELEASE ORAL
Qty: 0 | Refills: 0 | Status: DISCONTINUED | OUTPATIENT
Start: 2018-09-29 | End: 2018-10-03

## 2018-09-29 RX ORDER — ACETAMINOPHEN 500 MG
650 TABLET ORAL EVERY 6 HOURS
Qty: 0 | Refills: 0 | Status: DISCONTINUED | OUTPATIENT
Start: 2018-09-29 | End: 2018-10-03

## 2018-09-29 RX ORDER — DEXTROSE 50 % IN WATER 50 %
25 SYRINGE (ML) INTRAVENOUS ONCE
Qty: 0 | Refills: 0 | Status: DISCONTINUED | OUTPATIENT
Start: 2018-09-29 | End: 2018-10-01

## 2018-09-29 RX ORDER — INSULIN GLARGINE 100 [IU]/ML
13 INJECTION, SOLUTION SUBCUTANEOUS ONCE
Qty: 0 | Refills: 0 | Status: COMPLETED | OUTPATIENT
Start: 2018-09-29 | End: 2018-09-29

## 2018-09-29 RX ORDER — DEXTROSE 50 % IN WATER 50 %
15 SYRINGE (ML) INTRAVENOUS ONCE
Qty: 0 | Refills: 0 | Status: DISCONTINUED | OUTPATIENT
Start: 2018-09-29 | End: 2018-10-01

## 2018-09-29 RX ORDER — INSULIN LISPRO 100/ML
4 VIAL (ML) SUBCUTANEOUS
Qty: 0 | Refills: 0 | Status: DISCONTINUED | OUTPATIENT
Start: 2018-09-29 | End: 2018-10-01

## 2018-09-29 RX ORDER — GLUCAGON INJECTION, SOLUTION 0.5 MG/.1ML
1 INJECTION, SOLUTION SUBCUTANEOUS ONCE
Qty: 0 | Refills: 0 | Status: DISCONTINUED | OUTPATIENT
Start: 2018-09-29 | End: 2018-10-01

## 2018-09-29 RX ADMIN — Medication 650 MILLIGRAM(S): at 23:39

## 2018-09-29 RX ADMIN — Medication 50 MILLILITER(S): at 12:10

## 2018-09-29 RX ADMIN — Medication 30 MILLILITER(S): at 18:55

## 2018-09-29 RX ADMIN — INSULIN GLARGINE 13 UNIT(S): 100 INJECTION, SOLUTION SUBCUTANEOUS at 17:20

## 2018-09-29 RX ADMIN — Medication 4 UNIT(S): at 19:57

## 2018-09-29 RX ADMIN — MORPHINE SULFATE 2 MILLIGRAM(S): 50 CAPSULE, EXTENDED RELEASE ORAL at 21:06

## 2018-09-29 RX ADMIN — Medication 50 MILLILITER(S): at 08:05

## 2018-09-29 RX ADMIN — DEXTROSE MONOHYDRATE, SODIUM CHLORIDE, AND POTASSIUM CHLORIDE 200 MILLILITER(S): 50; .745; 4.5 INJECTION, SOLUTION INTRAVENOUS at 02:11

## 2018-09-29 RX ADMIN — Medication 650 MILLIGRAM(S): at 03:31

## 2018-09-29 RX ADMIN — INSULIN GLARGINE 12 UNIT(S): 100 INJECTION, SOLUTION SUBCUTANEOUS at 22:01

## 2018-09-29 RX ADMIN — MORPHINE SULFATE 2 MILLIGRAM(S): 50 CAPSULE, EXTENDED RELEASE ORAL at 21:36

## 2018-09-29 RX ADMIN — INSULIN HUMAN 2 UNIT(S)/HR: 100 INJECTION, SOLUTION SUBCUTANEOUS at 15:21

## 2018-09-29 RX ADMIN — Medication 2: at 19:57

## 2018-09-29 RX ADMIN — DEXTROSE MONOHYDRATE, SODIUM CHLORIDE, AND POTASSIUM CHLORIDE 100 MILLILITER(S): 50; .745; 4.5 INJECTION, SOLUTION INTRAVENOUS at 17:22

## 2018-09-29 RX ADMIN — DEXTROSE MONOHYDRATE, SODIUM CHLORIDE, AND POTASSIUM CHLORIDE 100 MILLILITER(S): 50; .745; 4.5 INJECTION, SOLUTION INTRAVENOUS at 23:39

## 2018-09-29 NOTE — CHART NOTE - NSCHARTNOTEFT_GEN_A_CORE
23M with PMH of DM I, ADHD p/w intractable vomiting and diffuse crampy abdominal pain that started morning of admission. Night prior to presentation he was celebrating birthday and binge drinking, around 20 drinks. Has vomiting about 10 times on day of admission - nonbloody.   Patient stated he is complaint with his medications however recent A1c was 14. He takes Levemir 34units daily and calculates his Humalog each meal. Reports he tries to not exceed 240g carbs in one day. Has had 3 prior episodes of DKA, last one was 3 years ago. Was diagnosed with DM type I when he was about 13yo. He cannot recall his endocrinologist's name.    In ED: Vitals /74, , T 96.4, 99% on RA. U/A +Ketones, glucose >1000, AG 40. VBG on presentation with pH 7.07 lactic acid 2.5. Patient was bolused 4L NS in ED.    Admitted to MICU for DKA. Patient was started on insulin drip and IVF. Transitioned to subq insulin and anion gap closed. Patient is feeling much better. DKA has resolved. Anticipate discharge  in next 24 hours.       23M with PMH of DM I, ADHD p/w DKA     #DKA   - resolved  - c/w insulin sliding scale  -c/w basal and bolus insulin    Diet: carb consistent  DVT ppx: SCDs / ambulation  GI ppx: protonix  DOWNGRADE TO MEDICAL FLOORS.    Dispo: from home   Joshua (father) - [phone] 23M with PMH of DM I, ADHD p/w intractable vomiting and diffuse crampy abdominal pain that started morning of admission. Night prior to presentation he was celebrating birthday and binge drinking, around 20 drinks. Has vomiting about 10 times on day of admission - nonbloody.   Patient stated he is complaint with his medications however recent A1c was 14. He takes Levemir 34units daily and calculates his Humalog each meal. Reports he tries to not exceed 240g carbs in one day. Has had 3 prior episodes of DKA, last one was 3 years ago. Was diagnosed with DM type I when he was about 13yo. He cannot recall his endocrinologist's name.    In ED: Vitals /74, , T 96.4, 99% on RA. U/A +Ketones, glucose >1000, AG 40. VBG on presentation with pH 7.07 lactic acid 2.5. Patient was bolused 4L NS in ED.    Admitted to MICU for DKA. Patient was started on insulin drip and IVF. Transitioned to subq insulin and anion gap closed. Patient is feeling much better. DKA has resolved. Anticipate discharge  in next 24 hours.       23M with PMH of DM I, ADHD p/w DKA     #DKA   - resolved  - c/w insulin sliding scale  -c/w basal and bolus insulin  -maalox prn for indigestion    Diet: carb consistent  DVT ppx: SCDs / ambulation  GI ppx: protonix  DOWNGRADE TO MEDICAL FLOORS.    Dispo: from home   Joshua (father) - [phone] 23M with PMH of DM I, ADHD p/w intractable vomiting and diffuse crampy abdominal pain that started morning of admission. Night prior to presentation he was celebrating birthday and binge drinking, around 20 drinks. Has vomiting about 10 times on day of admission - nonbloody.   Patient stated he is complaint with his medications however recent A1c was 14. He takes Levemir 34units daily and calculates his Humalog each meal. Reports he tries to not exceed 240g carbs in one day. Has had 3 prior episodes of DKA, last one was 3 years ago. Was diagnosed with DM type I when he was about 15yo. He cannot recall his endocrinologist's name.    In ED: Vitals /74, , T 96.4, 99% on RA. U/A +Ketones, glucose >1000, AG 40. VBG on presentation with pH 7.07 lactic acid 2.5. Patient was bolused 4L NS in ED.    Admitted to MICU for DKA. Patient was started on insulin drip and IVF. Transitioned to subq insulin and anion gap closed. Patient is feeling much better. DKA has resolved. Anticipate discharge  in next 24 hours.       23M with PMH of DM I, ADHD p/w DKA     #DKA   - resolved; anion gap closed  -f/u BMP tomorrow AM.   - c/w insulin sliding scale  -c/w 12 lantus and 4 humalog  -maalox prn for indigestion    Diet: carb consistent  DVT ppx: SCDs / ambulation  GI ppx: protonix  DOWNGRADE TO MEDICAL FLOORS.    Dispo: from home   Joshua (father) - [phone] 23M with PMH of DM I, ADHD p/w intractable vomiting and diffuse crampy abdominal pain that started morning of admission. Night prior to presentation he was celebrating birthday and binge drinking, around 20 drinks. Has vomiting about 10 times on day of admission - nonbloody.   Patient stated he is complaint with his medications however recent A1c was 14. He takes Levemir 34units daily and calculates his Humalog each meal. Reports he tries to not exceed 240g carbs in one day. Has had 3 prior episodes of DKA, last one was 3 years ago. Was diagnosed with DM type I when he was about 13yo. He cannot recall his endocrinologist's name.    In ED: Vitals /74, , T 96.4, 99% on RA. U/A +Ketones, glucose >1000, AG 40. VBG on presentation with pH 7.07 lactic acid 2.5. Patient was bolused 4L NS in ED.    Admitted to MICU for DKA. Patient was started on insulin drip and IVF. Transitioned to subq insulin and anion gap closed. Patient is feeling much better. DKA has resolved. Anticipate discharge  in next 24 hours.       23M with PMH of DM I, ADHD p/w DKA     #DKA   - resolved; anion gap closed  -f/u BMP tomorrow AM.   - c/w insulin sliding scale  -c/w 12 lantus and 4 humalog  -maalox prn for indigestion  -c/w IVF    Diet: carb consistent  DVT ppx: SCDs / ambulation  GI ppx: protonix  DOWNGRADE TO MEDICAL FLOORS.    Dispo: from home   Joshua (father) - [phone] 23M with PMH of DM I, ADHD p/w intractable vomiting and diffuse crampy abdominal pain that started morning of admission. Night prior to presentation he was celebrating birthday and binge drinking, around 20 drinks. Has vomiting about 10 times on day of admission - nonbloody.   Patient stated he is complaint with his medications however recent A1c was 14. He takes Levemir 34units daily and calculates his Humalog each meal. Reports he tries to not exceed 240g carbs in one day. Has had 3 prior episodes of DKA, last one was 3 years ago. Was diagnosed with DM type I when he was about 13yo. He cannot recall his endocrinologist's name.    In ED: Vitals /74, , T 96.4, 99% on RA. U/A +Ketones, glucose >1000, AG 40. VBG on presentation with pH 7.07 lactic acid 2.5. Patient was bolused 4L NS in ED.    Admitted to MICU for DKA. Patient was started on insulin drip and IVF. Transitioned to subq insulin and anion gap closed. Patient is feeling much better. DKA has resolved. Anticipate discharge  in next 24 hours.       23M with PMH of DM I, ADHD p/w DKA     #DKA   - resolved; anion gap closed at 15  -f/u BMP tomorrow AM.   - c/w insulin sliding scale  -c/w 12 lantus and 4 humalog  -maalox prn for indigestion  -c/w IVF  -f/u 11:30 pm tonight for anion gap    Diet: carb consistent  DVT ppx: SCDs / ambulation  GI ppx: protonix  DOWNGRADE TO MEDICAL FLOORS.    Dispo: from home   Joshua (father) - [phone] 23M with PMH of DM I, ADHD p/w intractable vomiting and diffuse crampy abdominal pain that started morning of admission. Night prior to presentation he was celebrating birthday and binge drinking, around 20 drinks. Has vomiting about 10 times on day of admission - nonbloody.   Patient stated he is complaint with his medications however recent A1c was 14. He takes Levemir 34units daily and calculates his Humalog each meal. Reports he tries to not exceed 240g carbs in one day. Has had 3 prior episodes of DKA, last one was 3 years ago. Was diagnosed with DM type I when he was about 15yo. He cannot recall his endocrinologist's name.    In ED: Vitals /74, , T 96.4, 99% on RA. U/A +Ketones, glucose >1000, AG 40. VBG on presentation with pH 7.07 lactic acid 2.5. Patient was bolused 4L NS in ED.    Admitted to MICU for DKA. Patient was started on insulin drip and IVF. Transitioned to subq insulin and anion gap closed. Patient is feeling much better. DKA has resolved. Anticipate discharge  in next 24 hours.       23M with PMH of DM I, ADHD p/w DKA     #DKA   - resolved; anion gap closed at 15  -f/u BMP tomorrow AM.   - c/w insulin sliding scale  -c/w 12 lantus and 4 humalog  -maalox prn for indigestion  -c/w IVF  -f/u 11:30 pm tonight for anion gap    Diet: carb consistent  DVT ppx: SCDs / ambulation  GI ppx: protonix  DOWNGRADE TO MEDICAL FLOORS.    Dispo: from home   Joshua (father) - [phone]    Verbal sign out given to Dr Reggie Cronin on 9/29/18 at 8PM. 23M with PMH of DM I, ADHD p/w intractable vomiting and diffuse crampy abdominal pain that started morning of admission. Night prior to presentation he was celebrating birthday and binge drinking, around 20 drinks. Has vomiting about 10 times on day of admission - nonbloody.   Patient stated he is complaint with his medications however recent A1c was 14. He takes Levemir 34units daily and calculates his Humalog each meal. Reports he tries to not exceed 240g carbs in one day. Has had 3 prior episodes of DKA, last one was 3 years ago. Was diagnosed with DM type I when he was about 15yo. He cannot recall his endocrinologist's name.    In ED: Vitals /74, , T 96.4, 99% on RA. U/A +Ketones, glucose >1000, AG 40. VBG on presentation with pH 7.07 lactic acid 2.5. Patient was bolused 4L NS in ED.    Admitted to MICU for DKA. Patient was started on insulin drip and IVF. Transitioned to subq insulin and anion gap closed. Patient is feeling much better. DKA has resolved. Anticipate discharge  in next 24 hours.       23M with PMH of DM I, ADHD p/w DKA     #DKA   - resolved; anion gap closed at 15  -f/u BMP tomorrow AM.   - c/w insulin sliding scale  -c/w 12 lantus and 4 humalog  -maalox prn for indigestion  -c/w IVF  -f/u 11:30 pm tonight for anion gap  -f/u blood cx  -f/u UDS and SDS    #positive u/a  -f/u urine culture      Diet: carb consistent  DVT ppx: SCDs / ambulation  GI ppx: protonix  DOWNGRADE TO MEDICAL FLOORS.    Dispo: from home   Joshua (father) - [phone]    Verbal sign out given to Dr Reggie Cronin on 9/29/18 at 8PM.

## 2018-09-29 NOTE — CONSULT NOTE ADULT - ASSESSMENT
IMPRESSION:    DKA improved  HO DM    PLAN:    CNS: No Depressants     HEENT: Oral care    PULMONARY:  HOB @ 45 degrees.  CXR    CARDIOVASCULAR: D51/2 NS.      GI: GI prophylaxis.  Feeding.     RENAL:  Follow up lytes.  Correct as needed    INFECTIOUS DISEASE: Pan  cultures    HEMATOLOGICAL:  DVT prophylaxis.    ENDOCRINE:  Follow up FS.  Insulin protocol if needed.    MUSCULOSKELETAL:    OOB to chair     Possible transfer PM if stable

## 2018-09-29 NOTE — PROGRESS NOTE ADULT - ASSESSMENT
23M with PMH of DM I, ADHD p/w DKA     #DKA   - patient bolused 4L in ED, will c/w IVF NS at 200cc/hr   - Will replete K if <5.3 at 20-30meq per L of IVF  - C/w insulin drip at 0.1U/kg/hr   - Monitor FS q2H  - Once serum glucose falls to < 200 mg/dL change fluids to 5% dextrose with 0.45% sodium chloride   - BMP q4H until gap closes, monitor K, Na, Mg and AG     Dispo: from tre Lewis (father) - [phone] 23M with PMH of DM I, ADHD p/w DKA     #DKA   - patient bolused 4L in ED, will c/w IVF NS at 200cc/hr   - Will replete K if <5.3 at 20-30meq per L of IVF  - C/w insulin drip at 0.1U/kg/hr   - Monitor FS q2H  - Once serum glucose falls to < 200 mg/dL change fluids to 5% dextrose with 0.45% sodium chloride   - BMP q4H until gap closes, monitor K, Na, Mg and AG   - WBC count is18.32 9/28  - Serum Na is 136 > 137  - Serum K is 5.4 > 4.5  - AG IS 40 > 29  - Lactate 2.5      Dispo: from [place]   Joshua (father) - [phone] 23M with PMH of DM I, ADHD p/w DKA     #DKA   - patient bolused 4L in ED, will c/w IVF NS at 200cc/hr   - Will replete K if <5.3 at 20-30meq per L of IVF  - C/w insulin drip at 0.1U/kg/hr   - Monitor FS q2H  - Once serum glucose falls to < 200 mg/dL change fluids to 5% dextrose with 0.45% sodium chloride   - BMP q4H until gap closes, monitor K, Na, Mg and AG   - WBC count is18.32 9/28  - Serum Na is 136 > 137 >140  - Serum K is 5.4 > 4.5 >3.9  - AG IS 40 > 29 >21  - Lactate 2.5      Dispo: from home   Joshua (father) - [phone]

## 2018-09-29 NOTE — CONSULT NOTE ADULT - SUBJECTIVE AND OBJECTIVE BOX
Patient is a 23y old  Male who presents with a chief complaint of DKA (29 Sep 2018 06:13)      HPI:  23M with PMH of DM I, ADHD p/w intractable vomiting and diffuse crampy abdominal pain that started morning of admission. Night prior to presentation he was celebrating birthday and binge drinking, around 20 drinks. Has vomiting about 10 times today, nonbloody.  Denies any fever, chills, sick contacts, SOB, CP, back pain, dysuria. Patient states he is complaint with his medications however recent A1c was 14. He takes Levemir 34units daily and calculates his Humalog each meal. Reports he tries to not exceed 240g carbs in one day. Has had 3 prior episodes of DKA, last one was 3 years ago. Was diagnosed with DM type I when he was about 15yo. He cannot recall his endocrinologist's name.    In ED: Vitals /74, , T 96.4, 99% on RA. U/A +Ketones, glucose >1000, AG 40. VBG on presentation with pH 7.07 lactic acid 2.5. (28 Sep 2018 23:56)      PAST MEDICAL & SURGICAL HISTORY:  Diabetes type I  ADHD (Attention Deficit Hyperactivity Disorder)  No significant past surgical history      SOCIAL HX:   Smoking          no               ETOH       no                     Other    FAMILY HISTORY:  :  No known cardiovacular family hisotry     ROS:  See HPI     Allergies    No Known Allergies    Intolerances          PHYSICAL EXAM    ICU Vital Signs Last 24 Hrs  T(C): 35.8 (29 Sep 2018 08:00), Max: 36.8 (29 Sep 2018 03:30)  T(F): 96.5 (29 Sep 2018 08:00), Max: 98.3 (29 Sep 2018 03:30)  HR: 68 (29 Sep 2018 08:30) (68 - 118)  BP: 94/45 (29 Sep 2018 08:30) (94/45 - 132/81)  BP(mean): 62 (29 Sep 2018 08:30) (62 - 80)  ABP: --  ABP(mean): --  RR: 17 (29 Sep 2018 08:30) (16 - 43)  SpO2: 99% (29 Sep 2018 08:30) (99% - 100%)      General: In NAD   HEENT:  DONELL              Lymphatic system: No cervical LN   Lungs: Bilateral BS  Cardiovascular: Regular  Gastrointestinal: Soft, Positive BS  Musculoskeletal: No clubbing.  Moves all extremities.  Full range of motion   Skin: Warm.  Intact  Neurological: No motor or sensory deficit       09-28-18 @ 07:01  -  09-29-18 @ 07:00  --------------------------------------------------------  IN:    dextrose 5% + sodium chloride 0.45% with potassium chloride 20 mEq/L: 1000 mL    insulin Infusion: 10 mL  Total IN: 1010 mL    OUT:    Voided: 200 mL  Total OUT: 200 mL    Total NET: 810 mL      09-29-18 @ 07:01  -  09-29-18 @ 08:59  --------------------------------------------------------  IN:    dextrose 5% + sodium chloride 0.45% with potassium chloride 20 mEq/L: 400 mL    insulin Infusion: 4 mL  Total IN: 404 mL    OUT:  Total OUT: 0 mL    Total NET: 404 mL          LABS:                          16.5   18.32 )-----------( 325      ( 28 Sep 2018 21:05 )             48.4                                               09-29    140  |  100  |  12  ----------------------------<  70  3.9   |  19  |  0.8    AG 21    Ca    9.0      29 Sep 2018 04:30  Mg     1.7     09-29    TPro  7.1  /  Alb  4.3  /  TBili  0.5  /  DBili  x   /  AST  16  /  ALT  13  /  AlkPhos  161<H>  09-29                                             Urinalysis Basic - ( 28 Sep 2018 21:05 )    Color: Yellow / Appearance: Clear / SG: >=1.030 / pH: x  Gluc: x / Ketone: >=80  / Bili: Negative / Urobili: 0.2   Blood: x / Protein: 30 / Nitrite: Negative   Leuk Esterase: Negative / RBC: x / WBC x   Sq Epi: x / Non Sq Epi: x / Bacteria: x                                                  LIVER FUNCTIONS - ( 29 Sep 2018 04:30 )  Alb: 4.3 g/dL / Pro: 7.1 g/dL / ALK PHOS: 161 U/L / ALT: 13 U/L / AST: 16 U/L / GGT: x                                                                                                                                       X-Rays                                                                                     ECHO    MEDICATIONS  (STANDING):  dextrose 5% + sodium chloride 0.45% with potassium chloride 20 mEq/L 1000 milliLiter(s) (200 mL/Hr) IV Continuous <Continuous>  insulin Infusion 2 Unit(s)/Hr (2 mL/Hr) IV Continuous <Continuous>    MEDICATIONS  (PRN):  acetaminophen   Tablet .. 650 milliGRAM(s) Oral every 6 hours PRN Mild Pain (1 - 3)

## 2018-09-29 NOTE — PROGRESS NOTE ADULT - SUBJECTIVE AND OBJECTIVE BOX
SUBJECTIVE:  Patient is a 23y old Male who presents with a chief complaint of DKA (28 Sep 2018 23:56)  Currently admitted to medicine with the primary diagnosis of Type 1 diabetes mellitus with ketoacidosis without coma     INTERVAL HISTORY;  Today is hospital 01 day . This morning he is resting comfortably in bed and reports no new issues or overnight events.     PAST MEDICAL & SURGICAL HISTORY  Diabetes type I  ADHD (Attention Deficit Hyperactivity Disorder)  No significant past surgical history    SOCIAL HISTORY:  Negative for smoking/alcohol/drug use.     ALLERGIES:  No Known Allergies    MEDICATIONS:  STANDING MEDICATIONS  dextrose 5% + sodium chloride 0.45% with potassium chloride 20 mEq/L 1000 milliLiter(s) IV Continuous <Continuous>  insulin Infusion 2 Unit(s)/Hr IV Continuous <Continuous>    PRN MEDICATIONS  acetaminophen   Tablet .. 650 milliGRAM(s) Oral every 6 hours PRN    Home Medications:  HumaLOG 100 units/mL injectable solution:  (28 Sep 2018 21:31)  Levemir 100 units/mL subcutaneous solution: 34 unit(s) subcutaneous once a day (at bedtime) (29 Sep 2018 00:22)      VITALS:   T(F): 96.9  HR: 82  BP: 105/66  RR: 16  SpO2: 100%    LABS:                        16.5   18.32 )-----------( 325      ( 28 Sep 2018 21:05 )             48.4     09-28    137  |  97<L>  |  17  ----------------------------<  180<H>  4.5   |  11<L>  |  0.9    Ca    9.9      28 Sep 2018 23:00    TPro  8.2<H>  /  Alb  4.7  /  TBili  0.3  /  DBili  x   /  AST  19  /  ALT  16  /  AlkPhos  198<H>  09-28      Urinalysis Basic - ( 28 Sep 2018 21:05 )    Color: Yellow / Appearance: Clear / SG: >=1.030 / pH: x  Gluc: x / Ketone: >=80  / Bili: Negative / Urobili: 0.2   Blood: x / Protein: 30 / Nitrite: Negative   Leuk Esterase: Negative / RBC: x / WBC x   Sq Epi: x / Non Sq Epi: x / Bacteria: x      Lactate, Blood: 2.5 mmol/L <H> (09-28-18 @ 21:05)      RADIOLOGY:      PHYSICAL EXAM:  GEN: No acute distress  LUNGS: Clear to auscultation bilaterally   HEART: S1/S2 present. RRR.   ABD: Soft, non-tender, non-distended. Bowel sounds present  EXT: NC/NC/NE/2+PP/GUTIERREZ/Skin Intact.   NEURO: AAOX3 SUBJECTIVE:  Patient is a 23y old Male who presents with a chief complaint of DKA (28 Sep 2018 23:56)  Currently admitted to medicine with the primary diagnosis of Type 1 diabetes mellitus with ketoacidosis without coma     INTERVAL HISTORY;  Today is hospital 01 day . This morning he is resting comfortably in bed and reports no new issues or events.     PAST MEDICAL & SURGICAL HISTORY  Diabetes type I  ADHD (Attention Deficit Hyperactivity Disorder)  No significant past surgical history    SOCIAL HISTORY:  Negative for smoking/alcohol/drug use.     ALLERGIES:  No Known Allergies    MEDICATIONS:  STANDING MEDICATIONS  dextrose 5% + sodium chloride 0.45% with potassium chloride 20 mEq/L 1000 milliLiter(s) IV Continuous <Continuous>  insulin Infusion 2 Unit(s)/Hr IV Continuous <Continuous>    PRN MEDICATIONS  acetaminophen   Tablet .. 650 milliGRAM(s) Oral every 6 hours PRN    Home Medications:  HumaLOG 100 units/mL injectable solution:  (28 Sep 2018 21:31)  Levemir 100 units/mL subcutaneous solution: 34 unit(s) subcutaneous once a day (at bedtime) (29 Sep 2018 00:22)      VITALS:   T(F): 96.9  HR: 82  BP: 105/66  RR: 16  SpO2: 100%    LABS:                        16.5   18.32 )-----------( 325      ( 28 Sep 2018 21:05 )             48.4     09-28    137  |  97<L>  |  17  ----------------------------<  180<H>  4.5   |  11<L>  |  0.9    Ca    9.9      28 Sep 2018 23:00    TPro  8.2<H>  /  Alb  4.7  /  TBili  0.3  /  DBili  x   /  AST  19  /  ALT  16  /  AlkPhos  198<H>  09-28      Urinalysis Basic - ( 28 Sep 2018 21:05 )    Color: Yellow / Appearance: Clear / SG: >=1.030 / pH: x  Gluc: x / Ketone: >=80  / Bili: Negative / Urobili: 0.2   Blood: x / Protein: 30 / Nitrite: Negative   Leuk Esterase: Negative / RBC: x / WBC x   Sq Epi: x / Non Sq Epi: x / Bacteria: x      Lactate, Blood: 2.5 mmol/L <H> (09-28-18 @ 21:05)      RADIOLOGY:      PHYSICAL EXAM:  GEN: No acute distress  LUNGS: Clear to auscultation bilaterally   HEART: S1/S2 present. RRR.   ABD: Soft, non-tender, non-distended. Bowel sounds present  EXT: NC/NC/NE/2+PP/GUTIERREZ/Skin Intact.   NEURO: AAOX3

## 2018-09-30 LAB
ANION GAP SERPL CALC-SCNC: 16 MMOL/L — HIGH (ref 7–14)
ANION GAP SERPL CALC-SCNC: 18 MMOL/L — HIGH (ref 7–14)
BASOPHILS # BLD AUTO: 0.02 K/UL — SIGNIFICANT CHANGE UP (ref 0–0.2)
BASOPHILS NFR BLD AUTO: 0.2 % — SIGNIFICANT CHANGE UP (ref 0–1)
BUN SERPL-MCNC: 5 MG/DL — LOW (ref 10–20)
BUN SERPL-MCNC: 7 MG/DL — LOW (ref 10–20)
CALCIUM SERPL-MCNC: 9.3 MG/DL — SIGNIFICANT CHANGE UP (ref 8.5–10.1)
CALCIUM SERPL-MCNC: 9.8 MG/DL — SIGNIFICANT CHANGE UP (ref 8.5–10.1)
CHLORIDE SERPL-SCNC: 100 MMOL/L — SIGNIFICANT CHANGE UP (ref 98–110)
CHLORIDE SERPL-SCNC: 99 MMOL/L — SIGNIFICANT CHANGE UP (ref 98–110)
CO2 SERPL-SCNC: 22 MMOL/L — SIGNIFICANT CHANGE UP (ref 17–32)
CO2 SERPL-SCNC: 24 MMOL/L — SIGNIFICANT CHANGE UP (ref 17–32)
CREAT SERPL-MCNC: 0.6 MG/DL — LOW (ref 0.7–1.5)
CREAT SERPL-MCNC: 0.6 MG/DL — LOW (ref 0.7–1.5)
CULTURE RESULTS: SIGNIFICANT CHANGE UP
EOSINOPHIL # BLD AUTO: 0.02 K/UL — SIGNIFICANT CHANGE UP (ref 0–0.7)
EOSINOPHIL NFR BLD AUTO: 0.2 % — SIGNIFICANT CHANGE UP (ref 0–8)
GLUCOSE BLDC GLUCOMTR-MCNC: 131 MG/DL — HIGH (ref 70–99)
GLUCOSE BLDC GLUCOMTR-MCNC: 152 MG/DL — HIGH (ref 70–99)
GLUCOSE BLDC GLUCOMTR-MCNC: 170 MG/DL — HIGH (ref 70–99)
GLUCOSE BLDC GLUCOMTR-MCNC: 190 MG/DL — HIGH (ref 70–99)
GLUCOSE BLDC GLUCOMTR-MCNC: 216 MG/DL — HIGH (ref 70–99)
GLUCOSE SERPL-MCNC: 133 MG/DL — HIGH (ref 70–99)
GLUCOSE SERPL-MCNC: 220 MG/DL — HIGH (ref 70–99)
HCT VFR BLD CALC: 35.5 % — LOW (ref 42–52)
HGB BLD-MCNC: 12.6 G/DL — LOW (ref 14–18)
IMM GRANULOCYTES NFR BLD AUTO: 0.3 % — SIGNIFICANT CHANGE UP (ref 0.1–0.3)
LYMPHOCYTES # BLD AUTO: 1.94 K/UL — SIGNIFICANT CHANGE UP (ref 1.2–3.4)
LYMPHOCYTES # BLD AUTO: 18 % — LOW (ref 20.5–51.1)
MAGNESIUM SERPL-MCNC: 1.7 MG/DL — LOW (ref 1.8–2.4)
MCHC RBC-ENTMCNC: 31.7 PG — HIGH (ref 27–31)
MCHC RBC-ENTMCNC: 35.5 G/DL — SIGNIFICANT CHANGE UP (ref 32–37)
MCV RBC AUTO: 89.2 FL — SIGNIFICANT CHANGE UP (ref 80–94)
MONOCYTES # BLD AUTO: 1.22 K/UL — HIGH (ref 0.1–0.6)
MONOCYTES NFR BLD AUTO: 11.3 % — HIGH (ref 1.7–9.3)
NEUTROPHILS # BLD AUTO: 7.55 K/UL — HIGH (ref 1.4–6.5)
NEUTROPHILS NFR BLD AUTO: 70 % — SIGNIFICANT CHANGE UP (ref 42.2–75.2)
NRBC # BLD: 0 /100 WBCS — SIGNIFICANT CHANGE UP (ref 0–0)
PLATELET # BLD AUTO: 216 K/UL — SIGNIFICANT CHANGE UP (ref 130–400)
POTASSIUM SERPL-MCNC: 3.9 MMOL/L — SIGNIFICANT CHANGE UP (ref 3.5–5)
POTASSIUM SERPL-MCNC: 4.1 MMOL/L — SIGNIFICANT CHANGE UP (ref 3.5–5)
POTASSIUM SERPL-SCNC: 3.9 MMOL/L — SIGNIFICANT CHANGE UP (ref 3.5–5)
POTASSIUM SERPL-SCNC: 4.1 MMOL/L — SIGNIFICANT CHANGE UP (ref 3.5–5)
RBC # BLD: 3.98 M/UL — LOW (ref 4.7–6.1)
RBC # FLD: 12 % — SIGNIFICANT CHANGE UP (ref 11.5–14.5)
SODIUM SERPL-SCNC: 139 MMOL/L — SIGNIFICANT CHANGE UP (ref 135–146)
SODIUM SERPL-SCNC: 140 MMOL/L — SIGNIFICANT CHANGE UP (ref 135–146)
SPECIMEN SOURCE: SIGNIFICANT CHANGE UP
WBC # BLD: 10.78 K/UL — SIGNIFICANT CHANGE UP (ref 4.8–10.8)
WBC # FLD AUTO: 10.78 K/UL — SIGNIFICANT CHANGE UP (ref 4.8–10.8)

## 2018-09-30 RX ORDER — MAGNESIUM SULFATE 500 MG/ML
2 VIAL (ML) INJECTION ONCE
Qty: 0 | Refills: 0 | Status: COMPLETED | OUTPATIENT
Start: 2018-09-30 | End: 2018-09-30

## 2018-09-30 RX ADMIN — Medication 50 GRAM(S): at 16:41

## 2018-09-30 RX ADMIN — Medication 650 MILLIGRAM(S): at 22:38

## 2018-09-30 RX ADMIN — Medication 2: at 11:48

## 2018-09-30 RX ADMIN — PANTOPRAZOLE SODIUM 40 MILLIGRAM(S): 20 TABLET, DELAYED RELEASE ORAL at 06:04

## 2018-09-30 RX ADMIN — Medication 4: at 16:42

## 2018-09-30 RX ADMIN — INSULIN GLARGINE 12 UNIT(S): 100 INJECTION, SOLUTION SUBCUTANEOUS at 21:07

## 2018-09-30 RX ADMIN — Medication 650 MILLIGRAM(S): at 22:08

## 2018-09-30 RX ADMIN — Medication 4 UNIT(S): at 08:02

## 2018-09-30 RX ADMIN — Medication 4 UNIT(S): at 16:42

## 2018-09-30 RX ADMIN — Medication 650 MILLIGRAM(S): at 00:09

## 2018-09-30 RX ADMIN — DEXTROSE MONOHYDRATE, SODIUM CHLORIDE, AND POTASSIUM CHLORIDE 100 MILLILITER(S): 50; .745; 4.5 INJECTION, SOLUTION INTRAVENOUS at 05:11

## 2018-09-30 RX ADMIN — Medication 4 UNIT(S): at 11:48

## 2018-09-30 NOTE — PROGRESS NOTE ADULT - SUBJECTIVE AND OBJECTIVE BOX
CHIEF COMPLAINT:    23M with PMH of DM I (uses insulin pens) p/w intractable vomiting and diffuse crampy abdominal pain that started morning of admission, found to be in DKA 2/2 binge drinking.    INTERVAL HPI/OVERNIGHT EVENTS:    Downgraded from MICU as anion gap starting to close. Transitioned to subq insulin and d51/2NS.atient seen and examined.    ROS: No nausea, vomiting. All other systems are negative.    Vital Signs:    T(F): 97.3 (18 @ 12:22), Max: 99.8 (18 @ 01:22)  HR: 89 (18 @ 12:22) (70 - 95)  BP: 134/73 (18 @ 12:22) (103/54 - 152/81)  RR: 18 (18 @ 12:22) (16 - 27)  SpO2: 98% (18 @ 01:22) (98% - 100%)  I&O's Summary    29 Sep 2018 07:01  -  30 Sep 2018 07:00  --------------------------------------------------------  IN: 3404 mL / OUT: 2550 mL / NET: 854 mL      Daily Height in cm: 172.72 (30 Sep 2018 01:22)    Daily Weight in k.1 (29 Sep 2018 16:00)  CAPILLARY BLOOD GLUCOSE  170 (30 Sep 2018 00:00)  174 (29 Sep 2018 22:00)  158 (29 Sep 2018 20:00)  148 (29 Sep 2018 19:00)  150 (29 Sep 2018 18:00)  139 (29 Sep 2018 17:00)  145 (29 Sep 2018 16:00)      POCT Blood Glucose.: 190 mg/dL (30 Sep 2018 11:29)  POCT Blood Glucose.: 131 mg/dL (30 Sep 2018 07:27)  POCT Blood Glucose.: 170 mg/dL (30 Sep 2018 00:15)  POCT Blood Glucose.: 174 mg/dL (29 Sep 2018 21:59)  POCT Blood Glucose.: 178 mg/dL (29 Sep 2018 21:07)  POCT Blood Glucose.: 158 mg/dL (29 Sep 2018 19:52)  POCT Blood Glucose.: 148 mg/dL (29 Sep 2018 19:25)  POCT Blood Glucose.: 150 mg/dL (29 Sep 2018 18:31)  POCT Blood Glucose.: 139 mg/dL (29 Sep 2018 17:07)  POCT Blood Glucose.: 145 mg/dL (29 Sep 2018 16:27)  POCT Blood Glucose.: 121 mg/dL (29 Sep 2018 15:15)  POCT Blood Glucose.: 103 mg/dL (29 Sep 2018 14:05)  POCT Blood Glucose.: 104 mg/dL (29 Sep 2018 13:12)      PHYSICAL EXAM:    GENERAL:  NAD  SKIN: No rashes or lesions  HEENT: Atraumatic. Normocephalic. Anicteric, wears glasses. Moist mucous membranes.  NECK:  No JVD.   PULMONARY: Clear to ausculation bilaterally. No wheezing. No rales  CVS: Normal S1, S2. Regular rate and rhythm. No murmurs.  ABDOMEN/GI: Soft, Nontender, Nondistended; Bowel sounds are present  EXTREMITIES: Peripheral pulses intact. No edema B/L LE.  NEUROLOGIC:  No motor or sensory deficit.  PSYCH: Alert & oriented x 3, normal affect    Consultant(s) Notes Reviewed:  [x ] YES  [ ] NO  Care Discussed with Consultants/Other Providers [ x] YES  [ ] NO    EKG reviewed    LABS:                        12.6   10.78 )-----------( 216      ( 30 Sep 2018 07:12 )             35.5         140  |  100  |  5<L>  ----------------------------<  133<H>  4.1   |  24  |  0.6<L>    Ca    9.3      30 Sep 2018 07:12  Mg     1.7         anion gap 16    TPro  7.1  /  Alb  4.3  /  TBili  0.5  /  DBili  x   /  AST  16  /  ALT  13  /  AlkPhos  161<H>          Trop <0.01, CKMB 2.2, CK 91, 18 @ 20:00  Trop <0.01, CKMB 2.3, CK 88, 18 @ 11:44      Culture - Urine (collected 28 Sep 2018 20:59)  Source: .Urine Clean Catch (Midstream)  Final Report (29 Sep 2018 23:11):    No growth        RADIOLOGY & ADDITIONAL TESTS:      Imaging or report Personally Reviewed:  [ x] YES  [ ] NO    Medications:  Standing  dextrose 5% + sodium chloride 0.45% with potassium chloride 20 mEq/L 1000 milliLiter(s) IV Continuous <Continuous>  dextrose 5%. 1000 milliLiter(s) IV Continuous <Continuous>  dextrose 50% Injectable 12.5 Gram(s) IV Push once  dextrose 50% Injectable 25 Gram(s) IV Push once  dextrose 50% Injectable 25 Gram(s) IV Push once  insulin glargine Injectable (LANTUS) 12 Unit(s) SubCutaneous at bedtime  insulin Infusion 2 Unit(s)/Hr IV Continuous <Continuous>  insulin lispro (HumaLOG) corrective regimen sliding scale   SubCutaneous three times a day before meals  insulin lispro Injectable (HumaLOG) 4 Unit(s) SubCutaneous three times a day before meals  pantoprazole    Tablet 40 milliGRAM(s) Oral before breakfast    PRN Meds  acetaminophen   Tablet .. 650 milliGRAM(s) Oral every 6 hours PRN  aluminum hydroxide/magnesium hydroxide/simethicone Suspension 30 milliLiter(s) Oral every 6 hours PRN  dextrose 40% Gel 15 Gram(s) Oral once PRN  glucagon  Injectable 1 milliGRAM(s) IntraMuscular once PRN      Case discussed with resident    Care discussed with pt/family-father

## 2018-09-30 NOTE — PROGRESS NOTE ADULT - ASSESSMENT
23M with PMH of DM I, ADHD p/w vomiting. Found to be 2/2 DKA    DKA   -anion gap elevated 16 although it may be due to low BUN  -f/u BMP 4 pm and AM.   -c/w subq insulin  -c/w IVF  -pt educated about alcohol use and diet    positive u/a  -asymptomatic    Hypomagnesemia  -repleted      Diet: carb consistent  DVT ppx: SCDs / ambulation  Likely discharge home in AM

## 2018-09-30 NOTE — PROGRESS NOTE ADULT - SUBJECTIVE AND OBJECTIVE BOX
SUBJECTIVE:    Patient is a 23y old Male who presents with a chief complaint of DKA (30 Sep 2018 12:42)    Currently admitted to medicine with the primary diagnosis of Type 1 diabetes mellitus with ketoacidosis without coma     Today is hospital day 1d. This morning he is resting comfortably in bed and reports no new issues or overnight events. Patient states he is feeling much better and slept well in the hospital.  Patient no longer endorsing vomiting, nausea, or abdominal pain.  Patient denies feeling week and states his energy has returned to baseline.  Understands the need for monitoring overnight to as his anion gap remains 16.    PAST MEDICAL & SURGICAL HISTORY  Diabetes type I  ADHD (Attention Deficit Hyperactivity Disorder)  No significant past surgical history    SOCIAL HISTORY:  Negative for smoking/alcohol/drug use.     ALLERGIES:  No Known Allergies    MEDICATIONS:  STANDING MEDICATIONS  dextrose 5% + sodium chloride 0.45% with potassium chloride 20 mEq/L 1000 milliLiter(s) IV Continuous <Continuous>  dextrose 5%. 1000 milliLiter(s) IV Continuous <Continuous>  dextrose 50% Injectable 12.5 Gram(s) IV Push once  dextrose 50% Injectable 25 Gram(s) IV Push once  dextrose 50% Injectable 25 Gram(s) IV Push once  insulin glargine Injectable (LANTUS) 12 Unit(s) SubCutaneous at bedtime  insulin Infusion 2 Unit(s)/Hr IV Continuous <Continuous>  insulin lispro (HumaLOG) corrective regimen sliding scale   SubCutaneous three times a day before meals  insulin lispro Injectable (HumaLOG) 4 Unit(s) SubCutaneous three times a day before meals  pantoprazole    Tablet 40 milliGRAM(s) Oral before breakfast    PRN MEDICATIONS  acetaminophen   Tablet .. 650 milliGRAM(s) Oral every 6 hours PRN  aluminum hydroxide/magnesium hydroxide/simethicone Suspension 30 milliLiter(s) Oral every 6 hours PRN  dextrose 40% Gel 15 Gram(s) Oral once PRN  glucagon  Injectable 1 milliGRAM(s) IntraMuscular once PRN    VITALS:   T(F): 97.3  HR: 89  BP: 134/73  RR: 18  SpO2: 98%    LABS:                        12.6   10.78 )-----------( 216      ( 30 Sep 2018 07:12 )             35.5     09-30    140  |  100  |  5<L>  ----------------------------<  133<H>  4.1   |  24  |  0.6<L>    Ca    9.3      30 Sep 2018 07:12  Mg     1.7         TPro  7.1  /  Alb  4.3  /  TBili  0.5  /  DBili  x   /  AST  16  /  ALT  13  /  AlkPhos  161<H>        Urinalysis Basic - ( 29 Sep 2018 13:30 )    Color: Yellow / Appearance: Clear / S.020 / pH: x  Gluc: x / Ketone: 40  / Bili: Small / Urobili: 0.2 mg/dL   Blood: x / Protein: Trace mg/dL / Nitrite: Negative   Leuk Esterase: Small / RBC: x / WBC 6-10 /HPF   Sq Epi: x / Non Sq Epi: x / Bacteria: x        Troponin T, Serum: <0.01 ng/mL (18 @ 20:00)  Creatine Kinase, Serum: 91 U/L (18 @ 20:00)      Culture - Blood (collected 29 Sep 2018 11:45)  Source: .Blood None  Preliminary Report (30 Sep 2018 18:01):    No growth to date.    Culture - Blood (collected 29 Sep 2018 11:44)  Source: .Blood None  Preliminary Report (30 Sep 2018 18:01):    No growth to date.    Culture - Urine (collected 28 Sep 2018 20:59)  Source: .Urine Clean Catch (Midstream)  Final Report (29 Sep 2018 23:11):    No growth      CARDIAC MARKERS ( 29 Sep 2018 20:00 )  x     / <0.01 ng/mL / 91 U/L / x     / 2.2 ng/mL  CARDIAC MARKERS ( 29 Sep 2018 11:44 )  x     / <0.01 ng/mL / 88 U/L / x     / 2.3 ng/mL      RADIOLOGY:    PHYSICAL EXAM:  GEN: No acute distress  LUNGS: Clear to auscultation bilaterally   HEART: S1/S2 present. RRR.   ABD: Soft, non-tender, non-distended. Bowel sounds present  EXT: NC/NC/NE/2+PP/GUTIERREZ/Skin Intact.   NEURO: AAOX3    Intravenous access:   NG tube:   Florence cathter:

## 2018-09-30 NOTE — PROGRESS NOTE ADULT - ASSESSMENT
Patient is a 23y old Male who presents with a chief complaint of DKA.  Currently admitted to medicine with the primary diagnosis of Type 1 diabetes mellitus with ketoacidosis without coma    Type 1 diabetes mellitus with diabetic ketoacidosis without coma    -9/30 AM anion gap: 16. f/u 4pm and 10/01 AM BMP; can be d/oriana if anion gap closed   -c/w subq insulin  -c/w IVF    Positive Urinalysis    -asymptomatic, no need for treatment    Hypomagnesemia    -repleted    Diet: carb consistent    DVT ppx: SCDs / ambulation

## 2018-10-01 LAB
ANION GAP SERPL CALC-SCNC: 14 MMOL/L — SIGNIFICANT CHANGE UP (ref 7–14)
ANION GAP SERPL CALC-SCNC: 15 MMOL/L — HIGH (ref 7–14)
BUN SERPL-MCNC: 8 MG/DL — LOW (ref 10–20)
BUN SERPL-MCNC: 9 MG/DL — LOW (ref 10–20)
CALCIUM SERPL-MCNC: 9.5 MG/DL — SIGNIFICANT CHANGE UP (ref 8.5–10.1)
CALCIUM SERPL-MCNC: 9.6 MG/DL — SIGNIFICANT CHANGE UP (ref 8.5–10.1)
CHLORIDE SERPL-SCNC: 100 MMOL/L — SIGNIFICANT CHANGE UP (ref 98–110)
CHLORIDE SERPL-SCNC: 101 MMOL/L — SIGNIFICANT CHANGE UP (ref 98–110)
CO2 SERPL-SCNC: 24 MMOL/L — SIGNIFICANT CHANGE UP (ref 17–32)
CO2 SERPL-SCNC: 24 MMOL/L — SIGNIFICANT CHANGE UP (ref 17–32)
CREAT SERPL-MCNC: 0.6 MG/DL — LOW (ref 0.7–1.5)
CREAT SERPL-MCNC: 0.6 MG/DL — LOW (ref 0.7–1.5)
CULTURE RESULTS: SIGNIFICANT CHANGE UP
ESTIMATED AVERAGE GLUCOSE: 286 MG/DL — HIGH (ref 68–114)
GLUCOSE BLDC GLUCOMTR-MCNC: 116 MG/DL — HIGH (ref 70–99)
GLUCOSE BLDC GLUCOMTR-MCNC: 182 MG/DL — HIGH (ref 70–99)
GLUCOSE BLDC GLUCOMTR-MCNC: 202 MG/DL — HIGH (ref 70–99)
GLUCOSE BLDC GLUCOMTR-MCNC: 212 MG/DL — HIGH (ref 70–99)
GLUCOSE BLDC GLUCOMTR-MCNC: 219 MG/DL — HIGH (ref 70–99)
GLUCOSE BLDC GLUCOMTR-MCNC: 68 MG/DL — LOW (ref 70–99)
GLUCOSE SERPL-MCNC: 254 MG/DL — HIGH (ref 70–99)
GLUCOSE SERPL-MCNC: 256 MG/DL — HIGH (ref 70–99)
HBA1C BLD-MCNC: 11.6 % — HIGH (ref 4–5.6)
PCP SPEC-MCNC: SIGNIFICANT CHANGE UP
POTASSIUM SERPL-MCNC: 3.8 MMOL/L — SIGNIFICANT CHANGE UP (ref 3.5–5)
POTASSIUM SERPL-MCNC: 3.9 MMOL/L — SIGNIFICANT CHANGE UP (ref 3.5–5)
POTASSIUM SERPL-SCNC: 3.8 MMOL/L — SIGNIFICANT CHANGE UP (ref 3.5–5)
POTASSIUM SERPL-SCNC: 3.9 MMOL/L — SIGNIFICANT CHANGE UP (ref 3.5–5)
SODIUM SERPL-SCNC: 139 MMOL/L — SIGNIFICANT CHANGE UP (ref 135–146)
SODIUM SERPL-SCNC: 139 MMOL/L — SIGNIFICANT CHANGE UP (ref 135–146)
SPECIMEN SOURCE: SIGNIFICANT CHANGE UP

## 2018-10-01 RX ORDER — INSULIN LISPRO 100/ML
VIAL (ML) SUBCUTANEOUS
Qty: 0 | Refills: 0 | Status: DISCONTINUED | OUTPATIENT
Start: 2018-10-01 | End: 2018-10-03

## 2018-10-01 RX ORDER — INSULIN GLARGINE 100 [IU]/ML
14 INJECTION, SOLUTION SUBCUTANEOUS AT BEDTIME
Qty: 0 | Refills: 0 | Status: DISCONTINUED | OUTPATIENT
Start: 2018-10-01 | End: 2018-10-03

## 2018-10-01 RX ORDER — SODIUM CHLORIDE 9 MG/ML
1000 INJECTION, SOLUTION INTRAVENOUS
Qty: 0 | Refills: 0 | Status: DISCONTINUED | OUTPATIENT
Start: 2018-10-01 | End: 2018-10-03

## 2018-10-01 RX ORDER — INSULIN LISPRO 100/ML
6 VIAL (ML) SUBCUTANEOUS
Qty: 0 | Refills: 0 | Status: DISCONTINUED | OUTPATIENT
Start: 2018-10-01 | End: 2018-10-03

## 2018-10-01 RX ORDER — GLUCAGON INJECTION, SOLUTION 0.5 MG/.1ML
1 INJECTION, SOLUTION SUBCUTANEOUS ONCE
Qty: 0 | Refills: 0 | Status: DISCONTINUED | OUTPATIENT
Start: 2018-10-01 | End: 2018-10-03

## 2018-10-01 RX ORDER — DEXTROSE 50 % IN WATER 50 %
25 SYRINGE (ML) INTRAVENOUS ONCE
Qty: 0 | Refills: 0 | Status: DISCONTINUED | OUTPATIENT
Start: 2018-10-01 | End: 2018-10-03

## 2018-10-01 RX ORDER — DEXTROSE 50 % IN WATER 50 %
12.5 SYRINGE (ML) INTRAVENOUS ONCE
Qty: 0 | Refills: 0 | Status: DISCONTINUED | OUTPATIENT
Start: 2018-10-01 | End: 2018-10-03

## 2018-10-01 RX ORDER — DEXTROSE 50 % IN WATER 50 %
15 SYRINGE (ML) INTRAVENOUS ONCE
Qty: 0 | Refills: 0 | Status: DISCONTINUED | OUTPATIENT
Start: 2018-10-01 | End: 2018-10-03

## 2018-10-01 RX ADMIN — Medication 4 UNIT(S): at 08:01

## 2018-10-01 RX ADMIN — Medication 4: at 08:02

## 2018-10-01 RX ADMIN — Medication 6 UNIT(S): at 17:20

## 2018-10-01 RX ADMIN — PANTOPRAZOLE SODIUM 40 MILLIGRAM(S): 20 TABLET, DELAYED RELEASE ORAL at 06:02

## 2018-10-01 RX ADMIN — INSULIN GLARGINE 14 UNIT(S): 100 INJECTION, SOLUTION SUBCUTANEOUS at 21:39

## 2018-10-01 RX ADMIN — Medication 6 UNIT(S): at 12:03

## 2018-10-01 RX ADMIN — Medication 4: at 17:20

## 2018-10-01 RX ADMIN — DEXTROSE MONOHYDRATE, SODIUM CHLORIDE, AND POTASSIUM CHLORIDE 100 MILLILITER(S): 50; .745; 4.5 INJECTION, SOLUTION INTRAVENOUS at 03:58

## 2018-10-01 NOTE — PROGRESS NOTE ADULT - ASSESSMENT
Patient is a 23y old Male who presents with a chief complaint of DKA.  Currently admitted to medicine with the primary diagnosis of Type 1 diabetes mellitus with ketoacidosis without coma    Type 1 diabetes mellitus with diabetic ketoacidosis without coma    -10/01/18 Anion Gap values: 14 -> 15 ; f/u AM BMP to ensure closing of AG  -c/w subq insulin; dose adjusted to 14U Lantus and 6U Humalog TID AC, as patient glucose levels still elevated  -d/c IVF    Positive Urinalysis    -asymptomatic, no need for treatment    Hypomagnesemia    -repleted    Diet: carb consistent    DVT ppx: SCDs / ambulation Patient is a 23y old Male who presents with a chief complaint of DKA.  Currently admitted to medicine with the primary diagnosis of Type 1 diabetes mellitus with ketoacidosis without coma    Type 1 diabetes mellitus with diabetic ketoacidosis without coma    -10/01/18 Anion Gap values: 14 -> 15 ; f/u AM BMP to ensure closing of AG  -c/w subq insulin; dose adjusted to 14U Lantus and 6U Humalog TID AC, as patient glucose levels still elevated  -d/c IVF  -f/u with outpatient endocrinologist to optimize Hba1c and Diabetes management     Positive Urinalysis    -asymptomatic, no need for treatment    Hypomagnesemia    -repleted    Diet: carb consistent    DVT ppx: SCDs / ambulation

## 2018-10-01 NOTE — PROGRESS NOTE ADULT - SUBJECTIVE AND OBJECTIVE BOX
SUBJECTIVE:    Patient is a 23y old Male who presents with a chief complaint of DKA (30 Sep 2018 18:52)    Currently admitted to medicine with the primary diagnosis of Type 1 diabetes mellitus with ketoacidosis without coma     Today is hospital day 2d. This morning he is resting comfortably in bed and reports no new issues or overnight events. Patient states that he feels well and has no new complaints.  Patient denies feeling weak, any nausea or vomiting.  Patient educated on importance of diet and proper insulin dosage and HbA1c control to avoid future complications  Patient urged to follow up with OP endocrinologist within 3 days of d/c.    PAST MEDICAL & SURGICAL HISTORY  Diabetes type I  ADHD (Attention Deficit Hyperactivity Disorder)  No significant past surgical history    SOCIAL HISTORY:  Negative for smoking/alcohol/drug use.     ALLERGIES:  No Known Allergies    MEDICATIONS:  STANDING MEDICATIONS  dextrose 5%. 1000 milliLiter(s) IV Continuous <Continuous>  dextrose 50% Injectable 12.5 Gram(s) IV Push once  dextrose 50% Injectable 25 Gram(s) IV Push once  dextrose 50% Injectable 25 Gram(s) IV Push once  insulin glargine Injectable (LANTUS) 14 Unit(s) SubCutaneous at bedtime  insulin lispro (HumaLOG) corrective regimen sliding scale   SubCutaneous three times a day before meals  insulin lispro Injectable (HumaLOG) 6 Unit(s) SubCutaneous three times a day before meals  pantoprazole    Tablet 40 milliGRAM(s) Oral before breakfast    PRN MEDICATIONS  acetaminophen   Tablet .. 650 milliGRAM(s) Oral every 6 hours PRN  aluminum hydroxide/magnesium hydroxide/simethicone Suspension 30 milliLiter(s) Oral every 6 hours PRN  dextrose 40% Gel 15 Gram(s) Oral once PRN  glucagon  Injectable 1 milliGRAM(s) IntraMuscular once PRN    VITALS:   T(F): 96.9  HR: 71  BP: 118/57  RR: 18  SpO2: 97%    LABS:                        12.6   10.78 )-----------( 216      ( 30 Sep 2018 07:12 )             35.5     10-01    139  |  100  |  8<L>  ----------------------------<  254<H>  3.8   |  24  |  0.6<L>    Ca    9.5      01 Oct 2018 08:28  Mg     1.7     09-30      Culture - Urine (collected 30 Sep 2018 06:00)  Source: .Urine Clean Catch (Midstream)  Final Report (01 Oct 2018 11:54):    50,000 - 99,000 CFU/mL Streptococcus agalactiae (Group B) isolated    Group B streptococci are susceptible to ampicillin,    penicillin and cefazolin, but may be resistant to    erythromycin and clindamycin.    Recommendations for intrapartum prophylaxis for Group B    streptococci are penicillin or ampicillin.    Culture - Urine (collected 29 Sep 2018 13:30)  Source: .Urine Catheterized  Final Report (30 Sep 2018 22:14):    50,000 - 99,000 CFU/mL Streptococcus agalactiae (Group B) isolated    Group B streptococci are susceptible to ampicillin,    penicillin and cefazolin, but may be resistant to    erythromycin and clindamycin.    Recommendations for intrapartum prophylaxis for Group B    streptococci are penicillin or ampicillin.    Culture - Blood (collected 29 Sep 2018 11:45)  Source: .Blood None  Preliminary Report (30 Sep 2018 18:01):    No growth to date.    Culture - Blood (collected 29 Sep 2018 11:44)  Source: .Blood None  Preliminary Report (30 Sep 2018 18:01):    No growth to date.    Culture - Urine (collected 28 Sep 2018 20:59)  Source: .Urine Clean Catch (Midstream)  Final Report (29 Sep 2018 23:11):    No growth      CARDIAC MARKERS ( 29 Sep 2018 20:00 )  x     / <0.01 ng/mL / 91 U/L / x     / 2.2 ng/mL      RADIOLOGY:    PHYSICAL EXAM:  GEN: No acute distress  LUNGS: Clear to auscultation bilaterally   HEART: S1/S2 present. RRR.   ABD: Soft, non-tender, non-distended. Bowel sounds present  EXT: NC/NC/NE/2+PP/GUTIERREZ/Skin Intact.   NEURO: AAOX3    Intravenous access:   NG tube:   Florence cathter:

## 2018-10-01 NOTE — PROGRESS NOTE ADULT - ATTENDING COMMENTS
Patient is a 23y old Male who presents with a chief complaint of DKA.  Currently admitted to medicine with the primary diagnosis of Type 1 diabetes mellitus with ketoacidosis without coma    #Type 1 diabetes mellitus with diabetic ketoacidosis without coma    Anion gap closed previously. Now around 15.   -c/w subq insulin; dose adjusted to 14U Lantus and 6U Humalog TID AC, as patient glucose levels still elevated  -d/c IVF  -f/u with outpatient endocrinologist to optimize Hba1c and Diabetes management     Positive Urinalysis    -asymptomatic, no need for treatment    Hypomagnesemia    -repleted    Diet: carb consistent    DVT ppx: SCDs / ambulation    Anticipate d/c tomorrow.

## 2018-10-02 ENCOUNTER — TRANSCRIPTION ENCOUNTER (OUTPATIENT)
Age: 23
End: 2018-10-02

## 2018-10-02 LAB
AMPHET UR-MCNC: NEGATIVE — SIGNIFICANT CHANGE UP
ANION GAP SERPL CALC-SCNC: 17 MMOL/L — HIGH (ref 7–14)
BARBITURATES UR SCN-MCNC: NEGATIVE — SIGNIFICANT CHANGE UP
BENZODIAZ UR-MCNC: NEGATIVE — SIGNIFICANT CHANGE UP
BUN SERPL-MCNC: 14 MG/DL — SIGNIFICANT CHANGE UP (ref 10–20)
CALCIUM SERPL-MCNC: 10.4 MG/DL — HIGH (ref 8.5–10.1)
CHLORIDE SERPL-SCNC: 98 MMOL/L — SIGNIFICANT CHANGE UP (ref 98–110)
CO2 SERPL-SCNC: 25 MMOL/L — SIGNIFICANT CHANGE UP (ref 17–32)
COCAINE METAB.OTHER UR-MCNC: NEGATIVE — SIGNIFICANT CHANGE UP
CREAT SERPL-MCNC: 0.7 MG/DL — SIGNIFICANT CHANGE UP (ref 0.7–1.5)
GLUCOSE BLDC GLUCOMTR-MCNC: 118 MG/DL — HIGH (ref 70–99)
GLUCOSE BLDC GLUCOMTR-MCNC: 156 MG/DL — HIGH (ref 70–99)
GLUCOSE BLDC GLUCOMTR-MCNC: 180 MG/DL — HIGH (ref 70–99)
GLUCOSE BLDC GLUCOMTR-MCNC: 199 MG/DL — HIGH (ref 70–99)
GLUCOSE BLDC GLUCOMTR-MCNC: 273 MG/DL — HIGH (ref 70–99)
GLUCOSE BLDC GLUCOMTR-MCNC: 92 MG/DL — SIGNIFICANT CHANGE UP (ref 70–99)
GLUCOSE SERPL-MCNC: 212 MG/DL — HIGH (ref 70–99)
METHADONE UR-MCNC: NEGATIVE — SIGNIFICANT CHANGE UP
OPIATES UR-MCNC: NEGATIVE — SIGNIFICANT CHANGE UP
POTASSIUM SERPL-MCNC: 4 MMOL/L — SIGNIFICANT CHANGE UP (ref 3.5–5)
POTASSIUM SERPL-SCNC: 4 MMOL/L — SIGNIFICANT CHANGE UP (ref 3.5–5)
PROPOXYPHENE QUALITATIVE URINE RESULT: NEGATIVE — SIGNIFICANT CHANGE UP
SODIUM SERPL-SCNC: 140 MMOL/L — SIGNIFICANT CHANGE UP (ref 135–146)

## 2018-10-02 RX ORDER — INSULIN LISPRO 100/ML
6 VIAL (ML) SUBCUTANEOUS
Qty: 6 | Refills: 0 | OUTPATIENT
Start: 2018-10-02 | End: 2018-10-31

## 2018-10-02 RX ORDER — INSULIN DETEMIR 100/ML (3)
34 INSULIN PEN (ML) SUBCUTANEOUS
Qty: 0 | Refills: 0 | COMMUNITY

## 2018-10-02 RX ORDER — INSULIN LISPRO 100/ML
0 VIAL (ML) SUBCUTANEOUS
Qty: 0 | Refills: 0 | COMMUNITY

## 2018-10-02 RX ORDER — ENOXAPARIN SODIUM 100 MG/ML
14 INJECTION SUBCUTANEOUS
Qty: 5 | Refills: 0 | OUTPATIENT
Start: 2018-10-02 | End: 2018-10-31

## 2018-10-02 RX ORDER — CHLORHEXIDINE GLUCONATE 213 G/1000ML
1 SOLUTION TOPICAL
Qty: 0 | Refills: 0 | Status: DISCONTINUED | OUTPATIENT
Start: 2018-10-02 | End: 2018-10-03

## 2018-10-02 RX ADMIN — Medication 6 UNIT(S): at 18:19

## 2018-10-02 RX ADMIN — INSULIN GLARGINE 14 UNIT(S): 100 INJECTION, SOLUTION SUBCUTANEOUS at 21:29

## 2018-10-02 RX ADMIN — Medication 2: at 08:10

## 2018-10-02 RX ADMIN — PANTOPRAZOLE SODIUM 40 MILLIGRAM(S): 20 TABLET, DELAYED RELEASE ORAL at 05:25

## 2018-10-02 RX ADMIN — Medication 2: at 12:20

## 2018-10-02 RX ADMIN — Medication 6 UNIT(S): at 08:09

## 2018-10-02 RX ADMIN — Medication 6 UNIT(S): at 12:20

## 2018-10-02 NOTE — DISCHARGE NOTE ADULT - CARE PLAN
Principal Discharge DX:	Type 1 diabetes mellitus with ketoacidosis without coma  Goal:	resolution  Assessment and plan of treatment:	You developed diabetic ketoacidosis secondary to your Type 1 Diabetes Mellitus diagnosis.  The episode has resolved since your admission and you are safe for discharge.  However, it is important to optimize your diabetes medications, hemoglobin A1C, and diet to prevent another episode of DKA and future complications.  Please follow up with your endocrinologist within three days of discharge.

## 2018-10-02 NOTE — DISCHARGE NOTE ADULT - MEDICATION SUMMARY - MEDICATIONS TO CHANGE
I will SWITCH the dose or number of times a day I take the medications listed below when I get home from the hospital:    HumaLOG 100 units/mL injectable solution I will SWITCH the dose or number of times a day I take the medications listed below when I get home from the hospital:  None

## 2018-10-02 NOTE — DISCHARGE NOTE ADULT - CARE PROVIDER_API CALL
Luis Armando Amaot  [address] [place] [address]   [id number]  Phone: [phone]  Fax: (   )    -

## 2018-10-02 NOTE — DISCHARGE NOTE ADULT - PROVIDER TOKENS
FREE:[LAST:[Lm],FIRST:[Luis Armando],PHONE:[phone]],FAX:[phone]   [phone]    -],ADDRESS:[[address] [place] [address]]]

## 2018-10-02 NOTE — PROGRESS NOTE ADULT - ATTENDING COMMENTS
Patient is a 23y old Male who presents with a chief complaint of DKA.  Currently admitted to medicine with the primary diagnosis of Type 1 diabetes mellitus with ketoacidosis without coma    #Type 1 diabetes mellitus with diabetic ketoacidosis without coma    Insulin drip was switched over to subcutaneous insulin even before the Anion gap completely closed (i.e. <12). Now although sugars are around 180s but AG still around 17. On the last check Beta hydroxy butyrate was still high. '  The current high AG may suggest that although sugar is better, there may still be unaccounted ketone anions. Although patient has no N/V and eating fine now.   We should probably increase him back to his home dose (i.e. Levemir 34), which will be a big jump  from his current Lantus 14.   So consult endocrine to see if they want to do that increase tonight and watch his sugars another day before letting him go. Have requested the endocrinologist who said he will see the patient today late evening.  Also check Venous pH in AM alongwith Beta hydroxy butyrate.     -d/c IVFs  - Eventual f/u with outpatient endocrinologist to optimize Hba1c and Diabetes management     Positive Urinalysis    -asymptomatic, no need for treatment    Hypomagnesemia    -repleted    Diet: carb consistent    DVT ppx: SCDs / ambulation    Anticipate d/c tomorrow. Patient is a 23y old Male who presents with a chief complaint of DKA.  Currently admitted to medicine with the primary diagnosis of Type 1 diabetes mellitus with ketoacidosis without coma    #Type 1 diabetes mellitus with diabetic ketoacidosis without coma    Insulin drip was switched over to subcutaneous insulin even before the Anion gap completely closed (i.e. <12). Now although sugars are around 180s but AG still around 17. On the last check Beta hydroxy butyrate was still high. '  The current high AG may suggest that although sugar is better, there may still be unaccounted ketone anions. Although patient has no N/V and eating fine now.   We should probably increase him back to his home dose (i.e. Levemir 34), which will be a big jump  from his current Lantus 14.   So consult endocrine to see if they want to do that increase tonight and watch his sugars another day before letting him go. Have requested the endocrinologist who said he will see the patient today late evening.  If doing another blood draw for Venous pH in AM, also check Beta hydroxy butyrate.     -d/c IVFs  - Eventual f/u with outpatient endocrinologist to optimize Hba1c and Diabetes management     Positive Urinalysis    -asymptomatic, no need for treatment    Hypomagnesemia    -repleted    Diet: carb consistent    DVT ppx: SCDs / ambulation    Anticipate d/c tomorrow.

## 2018-10-02 NOTE — DISCHARGE NOTE ADULT - PATIENT PORTAL LINK FT
You can access the [facility] Patient Portal, offered by [facility], by registering with the following website: [email or web address]

## 2018-10-02 NOTE — PROGRESS NOTE ADULT - SUBJECTIVE AND OBJECTIVE BOX
SUBJECTIVE:    Patient is a 23y old Male who presents with a chief complaint of DKA (02 Oct 2018 10:10)    Currently admitted to medicine with the primary diagnosis of Type 1 diabetes mellitus with ketoacidosis without coma     Today is hospital day 3d. This morning he is resting comfortably in bed and reports no new issues or overnight events. Patient does not report any new complaints and states that he is not experiencing any n/v/d/abdominal pain and feels at this baseline energy level and was ambulating around the unit yesterday evening.  Patient understands need to stay an extra night to be assessed by an endocrinologist, given his fluctuating anion gap.      PAST MEDICAL & SURGICAL HISTORY  Diabetes type I  ADHD (Attention Deficit Hyperactivity Disorder)  No significant past surgical history    SOCIAL HISTORY:  Negative for smoking/alcohol/drug use.     ALLERGIES:  No Known Allergies    MEDICATIONS:  STANDING MEDICATIONS  dextrose 5%. 1000 milliLiter(s) IV Continuous <Continuous>  dextrose 50% Injectable 12.5 Gram(s) IV Push once  dextrose 50% Injectable 25 Gram(s) IV Push once  dextrose 50% Injectable 25 Gram(s) IV Push once  insulin glargine Injectable (LANTUS) 14 Unit(s) SubCutaneous at bedtime  insulin lispro (HumaLOG) corrective regimen sliding scale   SubCutaneous three times a day before meals  insulin lispro Injectable (HumaLOG) 6 Unit(s) SubCutaneous three times a day before meals  pantoprazole    Tablet 40 milliGRAM(s) Oral before breakfast    PRN MEDICATIONS  acetaminophen   Tablet .. 650 milliGRAM(s) Oral every 6 hours PRN  aluminum hydroxide/magnesium hydroxide/simethicone Suspension 30 milliLiter(s) Oral every 6 hours PRN  dextrose 40% Gel 15 Gram(s) Oral once PRN  glucagon  Injectable 1 milliGRAM(s) IntraMuscular once PRN    VITALS:   T(F): 97.1  HR: 66  BP: 129/70  RR: 18  SpO2: --    LABS:    10-02    140  |  98  |  14  ----------------------------<  212<H>  4.0   |  25  |  0.7    Ca    10.4<H>      02 Oct 2018 08:52                Culture - Urine (collected 30 Sep 2018 06:00)  Source: .Urine Clean Catch (Midstream)  Final Report (01 Oct 2018 11:54):    50,000 - 99,000 CFU/mL Streptococcus agalactiae (Group B) isolated    Group B streptococci are susceptible to ampicillin,    penicillin and cefazolin, but may be resistant to    erythromycin and clindamycin.    Recommendations for intrapartum prophylaxis for Group B    streptococci are penicillin or ampicillin.      PHYSICAL EXAM:  GEN: No acute distress  LUNGS: Clear to auscultation bilaterally   HEART: S1/S2 present. RRR.   ABD: Soft, non-tender, non-distended. Bowel sounds present  EXT: NC/NC/NE/2+PP/GUTIERREZ/Skin Intact.   NEURO: AAOX3    Intravenous access:   NG tube:   [place] cathter:

## 2018-10-02 NOTE — DISCHARGE NOTE ADULT - MEDICATION SUMMARY - MEDICATIONS TO TAKE
I will START or STAY ON the medications listed below when I get home from the hospital:    Lant Solostar Pen 100 units/mL subcutaneous solution  -- 14 unit(s) subcutaneous once a day (at bedtime)   -- Do not drink alcoholic beverages when taking this medication.  It is very important that you take or use this exactly as directed.  Do not skip doses or discontinue unless directed by your doctor.  Keep in refrigerator.  Do not freeze.    -- Indication: For Diabetes type I    HumaLOG KwikPen 100 units/mL injectable solution  -- 6 unit(s) injectable 3 times a day   -- Indication: For Diabetes type I I will START or STAY ON the medications listed below when I get home from the hospital:    Levemir 100 units/mL subcutaneous solution  -- 34 unit(s) subcutaneous once a day (at bedtime)  -- Indication: For Diabetes type I    insulin lispro (concentrated) 200 units/mL subcutaneous solution  --  subcutaneous   -- Indication: For Diabetes type I

## 2018-10-02 NOTE — DISCHARGE NOTE ADULT - HOSPITAL COURSE
23M with PMH of DM I (on Levemir 34units daily and calculates Humalog each meal) and ADHD p/w intractable vomiting, diffuse crampy abdominal pain, and 10 episodes of NBNB vomiting, secondary to binge drinking the prior night. Patient stated he is complaint with his diabetes medications, however, recent A1c was 14. In the ED patient's vitals were as follows: /74, , T 96.4, 99% on RA. U/A +Ketones, glucose 593 and  AG 40. VBG on presentation with pH 7.07 lactic acid 2.5; patient was bolused 4L in the ED.  Patient was admitted to the ICU for DKA and placed on IVF NS at 200cc/hr and insulin drip at 0.1U/kg/hr with finger stick monitoring q2H and BMP monitoring q4h.  Following treatment patient's electrolytes began to normalize and AG improved from 40 > 29> 21 > 15, at which point he was transferred to the Medicine Floor, where his anion gap and glucose levels were further monitored.  Patient's glucose levels were suboptimal thus his Lantus 12U and Lispro 4U doses were adjusted to 14U and 6U, respectively.  Following this adjustment patient's glucose levels improved. He continued to show symptomatic improvement with closure of anion gap.  Patient is thus medically cleared for discharge and urged to follow up with his endocrinologist within 3 days of discharge.

## 2018-10-02 NOTE — DISCHARGE NOTE ADULT - PLAN OF CARE
resolution You developed diabetic ketoacidosis secondary to your Type 1 Diabetes Mellitus diagnosis.  The episode has resolved since your admission and you are safe for discharge.  However, it is important to optimize your diabetes medications, hemoglobin A1C, and diet to prevent another episode of DKA and future complications.  Please follow up with your endocrinologist within three days of discharge.

## 2018-10-02 NOTE — PROGRESS NOTE ADULT - ASSESSMENT
Patient is a 23y old Male who presents with a chief complaint of DKA.  Currently admitted to medicine with the primary diagnosis of Type 1 diabetes mellitus with ketoacidosis without coma    Type 1 diabetes mellitus with diabetic ketoacidosis without coma    -10/02/18 Anion Gap values: 14 -> 15 -> ; f/u AM BMP to ensure closing of AG; f/u with endocrinology consult (; [phone]), given patient's continued elevated gap  -c/w subq insulin; continue with 14U Lantus and 6U Humalog TID AC, as patient glucose levels still elevated  -d/c IVF  -f/u with outpatient endocrinologist to optimize Hba1c and Diabetes management     Positive Urinalysis    -asymptomatic, no need for treatment    Hypomagnesemia    -repleted    Diet: carb consistent    DVT ppx: SCDs / ambulation

## 2018-10-02 NOTE — CONSULT NOTE ADULT - SUBJECTIVE AND OBJECTIVE BOX
cc: dm 1    "Patient is a 23y old Male who presents with a chief complaint of DKA    Currently admitted to medicine with the primary diagnosis of Type 1 diabetes mellitus with ketoacidosis without coma     Today is hospital day 3d. This morning he is resting comfortably in bed and reports no new issues or overnight events. Patient does not report any new complaints and states that he is not experiencing any n/v/d/abdominal pain and feels at this baseline energy level and was ambulating around the unit yesterday evening."    He reports DKA was precipitated by ETOH consumption, not checking his sugar, and nausea and vomiting.  He was put on an insulin drip, stablized, and now is preparing for discharge    PAST MEDICAL & SURGICAL HISTORY  Diabetes type I  ADHD (Attention Deficit Hyperactivity Disorder)  No significant past surgical history    SOCIAL HISTORY:  Negative for smoking/alcohol/drug use.     ALLERGIES:  No Known Allergies    MEDICATIONS:  STANDING MEDICATIONS  dextrose 5%. 1000 milliLiter(s) IV Continuous <Continuous>  dextrose 50% Injectable 12.5 Gram(s) IV Push once  dextrose 50% Injectable 25 Gram(s) IV Push once  dextrose 50% Injectable 25 Gram(s) IV Push once  insulin glargine Injectable (LANTUS) 14 Unit(s) SubCutaneous at bedtime  insulin lispro (HumaLOG) corrective regimen sliding scale   SubCutaneous three times a day before meals  insulin lispro Injectable (HumaLOG) 6 Unit(s) SubCutaneous three times a day before meals  pantoprazole    Tablet 40 milliGRAM(s) Oral before breakfast    PRN MEDICATIONS  acetaminophen   Tablet .. 650 milliGRAM(s) Oral every 6 hours PRN  aluminum hydroxide/magnesium hydroxide/simethicone Suspension 30 milliLiter(s) Oral every 6 hours PRN  dextrose 40% Gel 15 Gram(s) Oral once PRN  glucagon  Injectable 1 milliGRAM(s) IntraMuscular once PRN    VITALS:   T(F): 97.1  HR: 66  BP: 129/70  RR: 18  SpO2: --    LABS:    10-02    140  |  98  |  14  ----------------------------<  212<H>  4.0   |  25  |  0.7    Ca    10.4<H>      02 Oct 2018 08:52                Culture - Urine (collected 30 Sep 2018 06:00)  Source: .Urine Clean Catch (Midstream)  Final Report (01 Oct 2018 11:54):    50,000 - 99,000 CFU/mL Streptococcus agalactiae (Group B) isolated    Group B streptococci are susceptible to ampicillin,    penicillin and cefazolin, but may be resistant to    erythromycin and clindamycin.    Recommendations for intrapartum prophylaxis for Group B    streptococci are penicillin or ampicillin.      PHYSICAL EXAM:  GEN: No acute distress  LUNGS: Clear to auscultation bilaterally   HEART: S1/S2 present. RRR.   ABD: Soft, non-tender, non-distended. Bowel sounds present  EXT: NC/NC/NE/2+PP/GUTIERREZ/Skin Intact.   NEURO: AAOX3            Assessment and Plan:   · Assessment		  Patient is a 23y old Male who presents with a chief complaint of DKA.  Currently admitted to medicine with the primary diagnosis of Type 1 diabetes mellitus with ketoacidosis without coma    Type 1 diabetes mellitus with diabetic ketoacidosis without coma      -continue with 14U Lantus and 6U Humalog TID AC, while the patient is admitted.  From an endocrine standpoint he's clear for discharge.  since his presentation was related to ETOH consumption, I advised ETOH cessation.  Further, I told him he needs to get an insulin pump and a CGM monitor.  He has not been seen by my associate, DR. Kimble, In over 1 year.    He should resume his outpateint insulin dose  He has an appointment for follow up with Dr. Kimble at our office on 10/12/18.  He should keep this appointment.

## 2018-10-02 NOTE — DISCHARGE NOTE ADULT - MEDICATION SUMMARY - MEDICATIONS TO STOP TAKING
I will STOP taking the medications listed below when I get home from the hospital:    Levemir 100 units/mL subcutaneous solution  -- 34 unit(s) subcutaneous once a day (at bedtime) I will STOP taking the medications listed below when I get home from the hospital:  None

## 2018-10-03 VITALS
TEMPERATURE: 97 F | SYSTOLIC BLOOD PRESSURE: 108 MMHG | DIASTOLIC BLOOD PRESSURE: 64 MMHG | RESPIRATION RATE: 18 BRPM | HEART RATE: 63 BPM

## 2018-10-03 LAB
GLUCOSE BLDC GLUCOMTR-MCNC: 178 MG/DL — HIGH (ref 70–99)
GLUCOSE BLDC GLUCOMTR-MCNC: 247 MG/DL — HIGH (ref 70–99)

## 2018-10-03 RX ORDER — INSULIN LISPRO 100/ML
0 VIAL (ML) SUBCUTANEOUS
Qty: 0 | Refills: 0 | COMMUNITY
Start: 2018-10-03

## 2018-10-03 RX ORDER — INSULIN DETEMIR 100/ML (3)
34 INSULIN PEN (ML) SUBCUTANEOUS
Qty: 0 | Refills: 0 | COMMUNITY
Start: 2018-10-03

## 2018-10-03 RX ADMIN — Medication 2: at 08:12

## 2018-10-03 RX ADMIN — CHLORHEXIDINE GLUCONATE 1 APPLICATION(S): 213 SOLUTION TOPICAL at 05:46

## 2018-10-03 RX ADMIN — Medication 6 UNIT(S): at 08:11

## 2018-10-03 RX ADMIN — Medication 4: at 12:19

## 2018-10-03 RX ADMIN — Medication 6 UNIT(S): at 12:18

## 2018-10-03 RX ADMIN — PANTOPRAZOLE SODIUM 40 MILLIGRAM(S): 20 TABLET, DELAYED RELEASE ORAL at 05:46

## 2018-10-03 NOTE — PROGRESS NOTE ADULT - SUBJECTIVE AND OBJECTIVE BOX
NEEMA WISE  23y  Male      Patient is a 23y old  Male who presents with a chief complaint of DKA (02 Oct 2018 20:24)      INTERVAL HPI/OVERNIGHT EVENTS:  He feels ok, no abdominal pain, nausea or vomiting.     Vital Signs Last 24 Hrs  T(C): 35.9 (03 Oct 2018 05:05), Max: 36.1 (02 Oct 2018 21:15)  T(F): 96.7 (03 Oct 2018 05:05), Max: 96.9 (02 Oct 2018 21:15)  HR: 63 (03 Oct 2018 05:05) (63 - 70)  BP: 108/64 (03 Oct 2018 05:05) (108/64 - 115/72)  BP(mean): --  RR: 18 (03 Oct 2018 05:05) (18 - 18)  SpO2: --            Consultant(s) Notes Reviewed:  [x ] YES  [ ] NO          MEDICATIONS  (STANDING):  chlorhexidine 4% Liquid 1 Application(s) Topical <User Schedule>  dextrose 5%. 1000 milliLiter(s) (50 mL/Hr) IV Continuous <Continuous>  dextrose 50% Injectable 12.5 Gram(s) IV Push once  dextrose 50% Injectable 25 Gram(s) IV Push once  dextrose 50% Injectable 25 Gram(s) IV Push once  insulin glargine Injectable (LANTUS) 14 Unit(s) SubCutaneous at bedtime  insulin lispro (HumaLOG) corrective regimen sliding scale   SubCutaneous three times a day before meals  insulin lispro Injectable (HumaLOG) 6 Unit(s) SubCutaneous three times a day before meals  pantoprazole    Tablet 40 milliGRAM(s) Oral before breakfast    MEDICATIONS  (PRN):  acetaminophen   Tablet .. 650 milliGRAM(s) Oral every 6 hours PRN Mild Pain (1 - 3)  aluminum hydroxide/magnesium hydroxide/simethicone Suspension 30 milliLiter(s) Oral every 6 hours PRN Dyspepsia  dextrose 40% Gel 15 Gram(s) Oral once PRN Blood Glucose LESS THAN 70 milliGRAM(s)/deciliter  glucagon  Injectable 1 milliGRAM(s) IntraMuscular once PRN Glucose LESS THAN 70 milligrams/deciliter      LABS      10-02    140  |  98  |  14  ----------------------------<  212<H>  4.0   |  25  |  0.7    Ca    10.4<H>      02 Oct 2018 08:52            Lactate Trend  09-28 @ 21:05 Lactate:2.5         CAPILLARY BLOOD GLUCOSE      POCT Blood Glucose.: 247 mg/dL (03 Oct 2018 11:46)      Culture - Urine (collected 09-30-18 @ 06:00)  Source: .Urine Clean Catch (Midstream)  Final Report (10-01-18 @ 11:54):    50,000 - 99,000 CFU/mL Streptococcus agalactiae (Group B) isolated    Group B streptococci are susceptible to ampicillin,    penicillin and cefazolin, but may be resistant to    erythromycin and clindamycin.    Recommendations for intrapartum prophylaxis for Group B    streptococci are penicillin or ampicillin.    Culture - Urine (collected 09-29-18 @ 13:30)  Source: .Urine Catheterized  Final Report (09-30-18 @ 22:14):    50,000 - 99,000 CFU/mL Streptococcus agalactiae (Group B) isolated    Group B streptococci are susceptible to ampicillin,    penicillin and cefazolin, but may be resistant to    erythromycin and clindamycin.    Recommendations for intrapartum prophylaxis for Group B    streptococci are penicillin or ampicillin.    Culture - Blood (collected 09-29-18 @ 11:45)  Source: .Blood None  Preliminary Report (09-30-18 @ 18:01):    No growth to date.    Culture - Blood (collected 09-29-18 @ 11:44)  Source: .Blood None  Preliminary Report (09-30-18 @ 18:01):    No growth to date.    Culture - Urine (collected 09-28-18 @ 20:59)  Source: .Urine Clean Catch (Midstream)  Final Report (09-29-18 @ 23:11):    No growth        RADIOLOGY & ADDITIONAL TESTS:    Imaging Personally Reviewed:  [ ] YES  [ ] NO    HEALTH ISSUES - PROBLEM Dx:        PHYSICAL EXAM:  GENERAL: NAD, well-developed  HEAD:  Atraumatic, Normocephalic  EYES: EOMI, PERRLA, conjunctiva and sclera clear  NECK: Supple, No JVD  CHEST/LUNG: Clear to auscultation bilaterally; No wheeze  HEART: Regular rate and rhythm; No murmurs, rubs, or gallops  ABDOMEN: Soft, Nontender, Nondistended; Bowel sounds present  EXTREMITIES:  2+ Peripheral Pulses, No clubbing, cyanosis, or edema

## 2018-10-03 NOTE — PROGRESS NOTE ADULT - ASSESSMENT
Patient is a 23y old Male who presents with a chief complaint of DKA.  Currently admitted to medicine with the primary diagnosis of Type 1 diabetes mellitus with ketoacidosis without coma    Type 1 diabetes mellitus with diabetic ketoacidosis without coma  Symptoms improved  FS is better controlled.   Endocrine is ok to discharge the patient home with his outpatient Insulin dose Levemir 34 units dialy  Follow up outpatient with Dr. Kimble on 10/12/18  Diabetic diet, sugar monitor at home reinforced.       Positive Urinalysis  Asymptomatic, no need for treatment        Disposition: ok to discharge home today.

## 2018-10-04 LAB
CULTURE RESULTS: SIGNIFICANT CHANGE UP
CULTURE RESULTS: SIGNIFICANT CHANGE UP
SPECIMEN SOURCE: SIGNIFICANT CHANGE UP
SPECIMEN SOURCE: SIGNIFICANT CHANGE UP

## 2018-10-11 DIAGNOSIS — F90.9 ATTENTION-DEFICIT HYPERACTIVITY DISORDER, UNSPECIFIED TYPE: ICD-10-CM

## 2018-10-11 DIAGNOSIS — E10.10 TYPE 1 DIABETES MELLITUS WITH KETOACIDOSIS WITHOUT COMA: ICD-10-CM

## 2018-10-11 DIAGNOSIS — E83.42 HYPOMAGNESEMIA: ICD-10-CM

## 2019-03-03 ENCOUNTER — EMERGENCY (EMERGENCY)
Facility: HOSPITAL | Age: 24
LOS: 0 days | Discharge: HOME | End: 2019-03-03
Attending: EMERGENCY MEDICINE | Admitting: EMERGENCY MEDICINE

## 2019-03-03 VITALS
HEART RATE: 89 BPM | RESPIRATION RATE: 18 BRPM | TEMPERATURE: 96 F | DIASTOLIC BLOOD PRESSURE: 86 MMHG | SYSTOLIC BLOOD PRESSURE: 130 MMHG

## 2019-03-03 DIAGNOSIS — E10.9 TYPE 1 DIABETES MELLITUS WITHOUT COMPLICATIONS: ICD-10-CM

## 2019-03-03 DIAGNOSIS — G62.9 POLYNEUROPATHY, UNSPECIFIED: ICD-10-CM

## 2019-03-03 DIAGNOSIS — M79.601 PAIN IN RIGHT ARM: ICD-10-CM

## 2019-03-03 DIAGNOSIS — Z79.4 LONG TERM (CURRENT) USE OF INSULIN: ICD-10-CM

## 2019-03-03 NOTE — ED PROVIDER NOTE - NSFOLLOWUPINSTRUCTIONS_ED_ALL_ED_FT
Neuropathy    Peripheral neuropathy is a type of nerve damage. It affects nerves that carry signals between the spinal cord and other parts of the body. Many things can damage peripheral nerves including diabetes. Signs and symptoms of neuropathy include numbness, tingling, pain, sensitive skin, weakness/paralysis of a body part, muscle twitching, loss of balance, not being able to control your bladder or sexual problems. If you have numbness in your feet check every day for signs of infection including redness, warmth and swelling.      SEEK IMMEDIATE MEDICAL CARE IF YOU HAVE THE FOLLOWING SYMPTOMS: an injury or infection that is not healing, dizziness, vomiting, chest pain, trouble breathing.

## 2019-03-03 NOTE — ED PROVIDER NOTE - CARE PROVIDER_API CALL
Jose Chen)  Neurology  [address] [place] [address]  Phone: [phone]  Fax: [phone]  Follow Up Time:

## 2019-03-03 NOTE — ED PROVIDER NOTE - NS ED ROS FT
Constitutional: no fever, chills, no recent weight loss, change in appetite or malaise  Cardiac: No chest pain, SOB or edema.  Respiratory: No cough or respiratory distress  GI: No nausea, vomiting, diarrhea or abdominal pain.  : No dysuria, frequency, urgency or hematuria  MS: see HPI  Neuro: No headache or weakness. No LOC.  Skin: see HPI  Endocrine: see HPI  Except as documented in the HPI, all other systems are negative.

## 2019-03-03 NOTE — ED ADULT TRIAGE NOTE - CHIEF COMPLAINT QUOTE
patient presents with complaint burning sensation to torso and generalized body x 3 weeks. denies any rash/itchiness, numbness or tingling. hx of diabetes

## 2019-03-03 NOTE — ED PROVIDER NOTE - CLINICAL SUMMARY MEDICAL DECISION MAKING FREE TEXT BOX
Sx suggestive of neuropathy, especially given history of very poorly controlled DM I. Had recent normal blood work by PMD, has follow up with neurology on Tuesday. No skin rash to suggest cellulitis. No unilateral, constant or localized sx to suggest DVT. Pains are to limbs, not chest, not suggestive of PE.     Will dc with follow up as described.

## 2019-03-03 NOTE — ED PROVIDER NOTE - OBJECTIVE STATEMENT
22 yo M, history of ADHD, DM I, currently on insulin pump with good control of daily glucose levels but previously very poorly controlled until January with initiation of pump. Here for assessment of intermittent sharp pain to arms, legs and occasionally flank, also painful "goosebumps" to skin over chest. All sx x 2-2.5 months. Gradually worsening. No exacerbating or alleviating sx. No chest pain, fever, chills, dyspnea, nausea, vomiting.     Only med is Humalog. No new creams, foods, exposures. 22 yo M, history of ADHD, DM I, currently on insulin pump with good control of daily glucose levels but previously very poorly controlled until January with initiation of pump. Here for assessment of intermittent sharp pain to arms, legs and occasionally flank, also painful "goosebumps" to skin over chest. All sx x 2-2.5 months. Gradually worsening. No exacerbating or alleviating sx. No chest pain, fever, chills, dyspnea, nausea, vomiting.     Only med is Humalog. No new creams, foods, exposures. No alcohol, drugs or tobacco.     Patient has seen PMD, derm for this, has follow up with neurology on Tuesday.

## 2019-03-03 NOTE — ED PROVIDER NOTE - PHYSICAL EXAMINATION
VITAL SIGNS: I have reviewed nursing notes and confirm.  CONSTITUTIONAL: Well-developed; well-nourished; in no acute distress.  SKIN: Skin exam is warm and dry, raised while macular rash to anterior chest, non tender, appears fungal, is being treated with topical antifungal by derm for this.   HEAD: Normocephalic; atraumatic.  EYES: PERRL, EOM intact; conjunctiva and sclera clear.  ENT: No nasal discharge; airway clear. TMs clear.  NECK: Supple; non tender.  CARD: RRR, no murmur  RESP: No wheezes, rales or rhonchi.  ABD: Normal bowel sounds; soft; non-distended; non-tender  EXT: Normal ROM. No clubbing, cyanosis or edema, no rash, no calf swelling, no tenderness to areas where patient has subjective pain, though is currently pain free  NEURO: Alert, oriented. Grossly unremarkable. No focal deficits.  PSYCH: Cooperative, appropriate.

## 2019-03-04 PROBLEM — E10.9 TYPE 1 DIABETES MELLITUS WITHOUT COMPLICATIONS: Chronic | Status: ACTIVE | Noted: 2018-09-28

## 2020-12-15 PROBLEM — Z87.09 HISTORY OF PHARYNGITIS: Status: RESOLVED | Noted: 2017-06-09 | Resolved: 2020-12-15

## 2022-04-04 NOTE — ED ADULT NURSE NOTE - NSSISCREENINGQ2_ED_A_ED
No
Detail Level: Simple
Render Risk Assessment In Note?: no
Additional Notes: Prescription sent incorrectly. The correct sig for medications is: Aczone 7.5% is thin layer QAM and Aklief is pea size amount every 3rd night and increasing as tolerated. Patient was called with the corrected information.

## 2022-07-30 NOTE — PATIENT PROFILE ADULT. - NSTRANFUSIONOBJECTION_GEN_ALL_CORE_SIUH
No respiratory distress. No stridor, Lungs sounds clear with good aeration bilaterally. Patient has no objection to blood transfusions.